# Patient Record
Sex: FEMALE | Race: BLACK OR AFRICAN AMERICAN | URBAN - METROPOLITAN AREA
[De-identification: names, ages, dates, MRNs, and addresses within clinical notes are randomized per-mention and may not be internally consistent; named-entity substitution may affect disease eponyms.]

---

## 2024-06-19 ENCOUNTER — HOSPITAL ENCOUNTER (INPATIENT)
Facility: HOSPITAL | Age: 49
LOS: 6 days | Discharge: HOME | DRG: 287 | End: 2024-06-25
Attending: EMERGENCY MEDICINE | Admitting: INTERNAL MEDICINE
Payer: COMMERCIAL

## 2024-06-19 ENCOUNTER — APPOINTMENT (OUTPATIENT)
Facility: HOSPITAL | Age: 49
DRG: 287 | End: 2024-06-19
Payer: COMMERCIAL

## 2024-06-19 ENCOUNTER — HOSPITAL ENCOUNTER (INPATIENT)
Facility: HOSPITAL | Age: 49
DRG: 287 | End: 2024-06-19
Attending: INTERNAL MEDICINE
Payer: COMMERCIAL

## 2024-06-19 DIAGNOSIS — R07.9 CHEST PAIN, UNSPECIFIED TYPE: ICD-10-CM

## 2024-06-19 DIAGNOSIS — R79.89 ELEVATED BRAIN NATRIURETIC PEPTIDE (BNP) LEVEL: ICD-10-CM

## 2024-06-19 DIAGNOSIS — I42.9 CARDIOMYOPATHY, UNSPECIFIED TYPE (HCC): ICD-10-CM

## 2024-06-19 DIAGNOSIS — J81.0 ACUTE PULMONARY EDEMA (HCC): ICD-10-CM

## 2024-06-19 DIAGNOSIS — I50.9 ACUTE CONGESTIVE HEART FAILURE, UNSPECIFIED HEART FAILURE TYPE (HCC): Primary | ICD-10-CM

## 2024-06-19 DIAGNOSIS — I50.42 HEART FAILURE, SYSTOLIC AND DIASTOLIC, CHRONIC (HCC): ICD-10-CM

## 2024-06-19 LAB
ALBUMIN SERPL-MCNC: 3.5 G/DL (ref 3.5–5)
ALBUMIN/GLOB SERPL: 0.9 (ref 1.1–2.2)
ALP SERPL-CCNC: 76 U/L (ref 45–117)
ALT SERPL-CCNC: 71 U/L (ref 12–78)
AMPHET UR QL SCN: NEGATIVE
ANION GAP SERPL CALC-SCNC: 5 MMOL/L (ref 5–15)
APPEARANCE UR: CLEAR
AST SERPL-CCNC: 56 U/L (ref 15–37)
BACTERIA URNS QL MICRO: NEGATIVE /HPF
BARBITURATES UR QL SCN: NEGATIVE
BASOPHILS # BLD: 0.1 K/UL (ref 0–0.1)
BASOPHILS NFR BLD: 1 % (ref 0–1)
BENZODIAZ UR QL: NEGATIVE
BILIRUB SERPL-MCNC: 1.4 MG/DL (ref 0.2–1)
BILIRUB UR QL: NEGATIVE
BUN SERPL-MCNC: 19 MG/DL (ref 6–20)
BUN/CREAT SERPL: 20 (ref 12–20)
CALCIUM SERPL-MCNC: 9.1 MG/DL (ref 8.5–10.1)
CANNABINOIDS UR QL SCN: NEGATIVE
CHLORIDE SERPL-SCNC: 109 MMOL/L (ref 97–108)
CO2 SERPL-SCNC: 24 MMOL/L (ref 21–32)
COCAINE UR QL SCN: NEGATIVE
COLOR UR: ABNORMAL
COMMENT:: NORMAL
CREAT SERPL-MCNC: 0.93 MG/DL (ref 0.55–1.02)
DIFFERENTIAL METHOD BLD: ABNORMAL
ECHO AO ASC DIAM: 2.4 CM
ECHO AO ASCENDING AORTA INDEX: 1.14 CM/M2
ECHO AO ROOT DIAM: 2.4 CM
ECHO AO ROOT INDEX: 1.14 CM/M2
ECHO AV AREA PEAK VELOCITY: 2.6 CM2
ECHO AV AREA VTI: 2.9 CM2
ECHO AV AREA/BSA PEAK VELOCITY: 1.2 CM2/M2
ECHO AV AREA/BSA VTI: 1.4 CM2/M2
ECHO AV MEAN GRADIENT: 3 MMHG
ECHO AV MEAN VELOCITY: 0.9 M/S
ECHO AV PEAK GRADIENT: 7 MMHG
ECHO AV PEAK VELOCITY: 1.4 M/S
ECHO AV VELOCITY RATIO: 0.79
ECHO AV VTI: 20.7 CM
ECHO BSA: 2.19 M2
ECHO EST RA PRESSURE: 5 MMHG
ECHO LA DIAMETER INDEX: 2.37 CM/M2
ECHO LA DIAMETER: 5 CM
ECHO LA TO AORTIC ROOT RATIO: 2.08
ECHO LA VOL A-L A2C: 121 ML (ref 22–52)
ECHO LA VOL A-L A4C: 136 ML (ref 22–52)
ECHO LA VOL MOD A2C: 116 ML (ref 22–52)
ECHO LA VOL MOD A4C: 124 ML (ref 22–52)
ECHO LA VOLUME AREA LENGTH: 135 ML
ECHO LA VOLUME INDEX A-L A2C: 57 ML/M2 (ref 16–34)
ECHO LA VOLUME INDEX A-L A4C: 64 ML/M2 (ref 16–34)
ECHO LA VOLUME INDEX AREA LENGTH: 64 ML/M2 (ref 16–34)
ECHO LA VOLUME INDEX MOD A2C: 55 ML/M2 (ref 16–34)
ECHO LA VOLUME INDEX MOD A4C: 59 ML/M2 (ref 16–34)
ECHO LV E' LATERAL VELOCITY: 6 CM/S
ECHO LV E' SEPTAL VELOCITY: 2 CM/S
ECHO LV FRACTIONAL SHORTENING: 6 % (ref 28–44)
ECHO LV INTERNAL DIMENSION DIASTOLE INDEX: 3.03 CM/M2
ECHO LV INTERNAL DIMENSION DIASTOLIC: 6.4 CM (ref 3.9–5.3)
ECHO LV INTERNAL DIMENSION SYSTOLIC INDEX: 2.84 CM/M2
ECHO LV INTERNAL DIMENSION SYSTOLIC: 6 CM
ECHO LV IVSD: 1 CM (ref 0.6–0.9)
ECHO LV MASS 2D: 330.4 G (ref 67–162)
ECHO LV MASS INDEX 2D: 156.6 G/M2 (ref 43–95)
ECHO LV POSTERIOR WALL DIASTOLIC: 1.3 CM (ref 0.6–0.9)
ECHO LV RELATIVE WALL THICKNESS RATIO: 0.41
ECHO LVOT AREA: 3.1 CM2
ECHO LVOT AV VTI INDEX: 0.94
ECHO LVOT DIAM: 2 CM
ECHO LVOT MEAN GRADIENT: 3 MMHG
ECHO LVOT PEAK GRADIENT: 5 MMHG
ECHO LVOT PEAK VELOCITY: 1.1 M/S
ECHO LVOT STROKE VOLUME INDEX: 29 ML/M2
ECHO LVOT SV: 61.2 ML
ECHO LVOT VTI: 19.5 CM
ECHO MV A VELOCITY: 0.45 M/S
ECHO MV AREA PHT: 3.3 CM2
ECHO MV AREA VTI: 2.2 CM2
ECHO MV E DECELERATION TIME (DT): 229.3 MS
ECHO MV E VELOCITY: 1.37 M/S
ECHO MV E/A RATIO: 3.04
ECHO MV E/E' LATERAL: 22.83
ECHO MV E/E' RATIO (AVERAGED): 45.67
ECHO MV E/E' SEPTAL: 68.5
ECHO MV LVOT VTI INDEX: 1.45
ECHO MV MAX VELOCITY: 1.6 M/S
ECHO MV MEAN GRADIENT: 3 MMHG
ECHO MV MEAN VELOCITY: 0.7 M/S
ECHO MV PEAK GRADIENT: 11 MMHG
ECHO MV PRESSURE HALF TIME (PHT): 66.5 MS
ECHO MV REGURGITANT PEAK GRADIENT: 81 MMHG
ECHO MV REGURGITANT PEAK VELOCITY: 4.5 M/S
ECHO MV VTI: 28.2 CM
ECHO PULMONARY ARTERY END DIASTOLIC PRESSURE: 24 MMHG
ECHO PV MAX VELOCITY: 0.8 M/S
ECHO PV PEAK GRADIENT: 3 MMHG
ECHO RIGHT VENTRICULAR SYSTOLIC PRESSURE (RVSP): 58 MMHG
ECHO RV FREE WALL PEAK S': 6 CM/S
ECHO RV TAPSE: 1.9 CM (ref 1.7–?)
ECHO TV REGURGITANT MAX VELOCITY: 3.65 M/S
ECHO TV REGURGITANT PEAK GRADIENT: 53 MMHG
EOSINOPHIL # BLD: 0.1 K/UL (ref 0–0.4)
EOSINOPHIL NFR BLD: 1 % (ref 0–7)
EPITH CASTS URNS QL MICRO: ABNORMAL /LPF
ERYTHROCYTE [DISTWIDTH] IN BLOOD BY AUTOMATED COUNT: 14.3 % (ref 11.5–14.5)
GLOBULIN SER CALC-MCNC: 3.8 G/DL (ref 2–4)
GLUCOSE BLD STRIP.AUTO-MCNC: 123 MG/DL (ref 65–117)
GLUCOSE BLD STRIP.AUTO-MCNC: 123 MG/DL (ref 65–117)
GLUCOSE SERPL-MCNC: 128 MG/DL (ref 65–100)
GLUCOSE UR STRIP.AUTO-MCNC: NEGATIVE MG/DL
HCT VFR BLD AUTO: 43.3 % (ref 35–47)
HGB BLD-MCNC: 14.1 G/DL (ref 11.5–16)
HGB UR QL STRIP: ABNORMAL
HYALINE CASTS URNS QL MICRO: ABNORMAL /LPF (ref 0–5)
IMM GRANULOCYTES # BLD AUTO: 0 K/UL (ref 0–0.04)
IMM GRANULOCYTES NFR BLD AUTO: 0 % (ref 0–0.5)
KETONES UR QL STRIP.AUTO: NEGATIVE MG/DL
LEUKOCYTE ESTERASE UR QL STRIP.AUTO: NEGATIVE
LIPASE SERPL-CCNC: 34 U/L (ref 13–75)
LYMPHOCYTES # BLD: 2.5 K/UL (ref 0.8–3.5)
LYMPHOCYTES NFR BLD: 28 % (ref 12–49)
Lab: NORMAL
MCH RBC QN AUTO: 33.1 PG (ref 26–34)
MCHC RBC AUTO-ENTMCNC: 32.6 G/DL (ref 30–36.5)
MCV RBC AUTO: 101.6 FL (ref 80–99)
METHADONE UR QL: NEGATIVE
MONOCYTES # BLD: 0.6 K/UL (ref 0–1)
MONOCYTES NFR BLD: 7 % (ref 5–13)
NEUTS SEG # BLD: 5.8 K/UL (ref 1.8–8)
NEUTS SEG NFR BLD: 63 % (ref 32–75)
NITRITE UR QL STRIP.AUTO: NEGATIVE
NRBC # BLD: 0 K/UL (ref 0–0.01)
NRBC BLD-RTO: 0 PER 100 WBC
NT PRO BNP: 1433 PG/ML
OPIATES UR QL: NEGATIVE
PCP UR QL: NEGATIVE
PH UR STRIP: 6.5 (ref 5–8)
PLATELET # BLD AUTO: 161 K/UL (ref 150–400)
PMV BLD AUTO: 11.6 FL (ref 8.9–12.9)
POTASSIUM SERPL-SCNC: 3.9 MMOL/L (ref 3.5–5.1)
PROT SERPL-MCNC: 7.3 G/DL (ref 6.4–8.2)
PROT UR STRIP-MCNC: NEGATIVE MG/DL
RBC # BLD AUTO: 4.26 M/UL (ref 3.8–5.2)
RBC #/AREA URNS HPF: ABNORMAL /HPF (ref 0–5)
SERVICE CMNT-IMP: ABNORMAL
SERVICE CMNT-IMP: ABNORMAL
SODIUM SERPL-SCNC: 138 MMOL/L (ref 136–145)
SP GR UR REFRACTOMETRY: 1.02 (ref 1–1.03)
SPECIMEN HOLD: NORMAL
TROPONIN I SERPL HS-MCNC: 33 NG/L (ref 0–51)
TROPONIN I SERPL HS-MCNC: 35 NG/L (ref 0–51)
TROPONIN I SERPL HS-MCNC: 38 NG/L (ref 0–51)
URINE CULTURE IF INDICATED: ABNORMAL
UROBILINOGEN UR QL STRIP.AUTO: 0.2 EU/DL (ref 0.2–1)
WBC # BLD AUTO: 9.1 K/UL (ref 3.6–11)
WBC URNS QL MICRO: ABNORMAL /HPF (ref 0–4)

## 2024-06-19 PROCEDURE — 2060000000 HC ICU INTERMEDIATE R&B

## 2024-06-19 PROCEDURE — 6370000000 HC RX 637 (ALT 250 FOR IP): Performed by: INTERNAL MEDICINE

## 2024-06-19 PROCEDURE — 83690 ASSAY OF LIPASE: CPT

## 2024-06-19 PROCEDURE — 76705 ECHO EXAM OF ABDOMEN: CPT

## 2024-06-19 PROCEDURE — 99285 EMERGENCY DEPT VISIT HI MDM: CPT

## 2024-06-19 PROCEDURE — 80307 DRUG TEST PRSMV CHEM ANLYZR: CPT

## 2024-06-19 PROCEDURE — 6360000002 HC RX W HCPCS: Performed by: INTERNAL MEDICINE

## 2024-06-19 PROCEDURE — 78227 HEPATOBIL SYST IMAGE W/DRUG: CPT

## 2024-06-19 PROCEDURE — 71046 X-RAY EXAM CHEST 2 VIEWS: CPT

## 2024-06-19 PROCEDURE — 83880 ASSAY OF NATRIURETIC PEPTIDE: CPT

## 2024-06-19 PROCEDURE — 6360000002 HC RX W HCPCS: Performed by: EMERGENCY MEDICINE

## 2024-06-19 PROCEDURE — 36415 COLL VENOUS BLD VENIPUNCTURE: CPT

## 2024-06-19 PROCEDURE — 6370000000 HC RX 637 (ALT 250 FOR IP): Performed by: EMERGENCY MEDICINE

## 2024-06-19 PROCEDURE — 96374 THER/PROPH/DIAG INJ IV PUSH: CPT

## 2024-06-19 PROCEDURE — 93306 TTE W/DOPPLER COMPLETE: CPT

## 2024-06-19 PROCEDURE — 96375 TX/PRO/DX INJ NEW DRUG ADDON: CPT

## 2024-06-19 PROCEDURE — 81001 URINALYSIS AUTO W/SCOPE: CPT

## 2024-06-19 PROCEDURE — 85025 COMPLETE CBC W/AUTO DIFF WBC: CPT

## 2024-06-19 PROCEDURE — 6360000004 HC RX CONTRAST MEDICATION: Performed by: RADIOLOGY

## 2024-06-19 PROCEDURE — 2580000003 HC RX 258: Performed by: INTERNAL MEDICINE

## 2024-06-19 PROCEDURE — 80053 COMPREHEN METABOLIC PANEL: CPT

## 2024-06-19 PROCEDURE — 84484 ASSAY OF TROPONIN QUANT: CPT

## 2024-06-19 PROCEDURE — 82962 GLUCOSE BLOOD TEST: CPT

## 2024-06-19 PROCEDURE — 3430000000 HC RX DIAGNOSTIC RADIOPHARMACEUTICAL: Performed by: INTERNAL MEDICINE

## 2024-06-19 PROCEDURE — 6360000004 HC RX CONTRAST MEDICATION: Performed by: INTERNAL MEDICINE

## 2024-06-19 PROCEDURE — A9537 TC99M MEBROFENIN: HCPCS | Performed by: INTERNAL MEDICINE

## 2024-06-19 PROCEDURE — 71275 CT ANGIOGRAPHY CHEST: CPT

## 2024-06-19 PROCEDURE — 2580000003 HC RX 258: Performed by: EMERGENCY MEDICINE

## 2024-06-19 RX ORDER — ENOXAPARIN SODIUM 100 MG/ML
30 INJECTION SUBCUTANEOUS 2 TIMES DAILY
Status: DISCONTINUED | OUTPATIENT
Start: 2024-06-19 | End: 2024-06-21

## 2024-06-19 RX ORDER — 0.9 % SODIUM CHLORIDE 0.9 %
1000 INTRAVENOUS SOLUTION INTRAVENOUS ONCE
Status: COMPLETED | OUTPATIENT
Start: 2024-06-19 | End: 2024-06-19

## 2024-06-19 RX ORDER — NITROGLYCERIN 0.4 MG/1
0.4 TABLET SUBLINGUAL EVERY 5 MIN PRN
Status: DISCONTINUED | OUTPATIENT
Start: 2024-06-19 | End: 2024-06-25 | Stop reason: HOSPADM

## 2024-06-19 RX ORDER — ONDANSETRON 2 MG/ML
4 INJECTION INTRAMUSCULAR; INTRAVENOUS EVERY 6 HOURS PRN
Status: DISCONTINUED | OUTPATIENT
Start: 2024-06-19 | End: 2024-06-25 | Stop reason: HOSPADM

## 2024-06-19 RX ORDER — HYDRALAZINE HYDROCHLORIDE 25 MG/1
25 TABLET, FILM COATED ORAL 2 TIMES DAILY
Status: COMPLETED | OUTPATIENT
Start: 2024-06-19 | End: 2024-06-20

## 2024-06-19 RX ORDER — ISOSORBIDE DINITRATE 10 MG/1
20 TABLET ORAL 2 TIMES DAILY
Status: DISCONTINUED | OUTPATIENT
Start: 2024-06-19 | End: 2024-06-20

## 2024-06-19 RX ORDER — NICOTINE 21 MG/24HR
1 PATCH, TRANSDERMAL 24 HOURS TRANSDERMAL DAILY
Status: DISCONTINUED | OUTPATIENT
Start: 2024-06-19 | End: 2024-06-19

## 2024-06-19 RX ORDER — ROSUVASTATIN CALCIUM 10 MG/1
10 TABLET, COATED ORAL NIGHTLY
Status: DISCONTINUED | OUTPATIENT
Start: 2024-06-19 | End: 2024-06-25 | Stop reason: HOSPADM

## 2024-06-19 RX ORDER — LOSARTAN POTASSIUM 25 MG/1
25 TABLET ORAL DAILY
Status: DISCONTINUED | OUTPATIENT
Start: 2024-06-19 | End: 2024-06-20

## 2024-06-19 RX ORDER — HYDROCHLOROTHIAZIDE 25 MG/1
TABLET ORAL
Status: ON HOLD | COMMUNITY
End: 2024-06-25 | Stop reason: HOSPADM

## 2024-06-19 RX ORDER — SODIUM CHLORIDE 0.9 % (FLUSH) 0.9 %
5-40 SYRINGE (ML) INJECTION PRN
Status: DISCONTINUED | OUTPATIENT
Start: 2024-06-19 | End: 2024-06-25 | Stop reason: HOSPADM

## 2024-06-19 RX ORDER — MAGNESIUM SULFATE IN WATER 40 MG/ML
2000 INJECTION, SOLUTION INTRAVENOUS PRN
Status: DISCONTINUED | OUTPATIENT
Start: 2024-06-19 | End: 2024-06-25 | Stop reason: HOSPADM

## 2024-06-19 RX ORDER — MILRINONE LACTATE 0.2 MG/ML
.0625-.75 INJECTION, SOLUTION INTRAVENOUS CONTINUOUS
Status: DISCONTINUED | OUTPATIENT
Start: 2024-06-19 | End: 2024-06-23

## 2024-06-19 RX ORDER — FUROSEMIDE 10 MG/ML
20 INJECTION INTRAMUSCULAR; INTRAVENOUS
Status: COMPLETED | OUTPATIENT
Start: 2024-06-19 | End: 2024-06-19

## 2024-06-19 RX ORDER — PANTOPRAZOLE SODIUM 40 MG/1
40 TABLET, DELAYED RELEASE ORAL
Status: DISCONTINUED | OUTPATIENT
Start: 2024-06-20 | End: 2024-06-25 | Stop reason: HOSPADM

## 2024-06-19 RX ORDER — ONDANSETRON 4 MG/1
4 TABLET, ORALLY DISINTEGRATING ORAL EVERY 8 HOURS PRN
Status: DISCONTINUED | OUTPATIENT
Start: 2024-06-19 | End: 2024-06-25 | Stop reason: HOSPADM

## 2024-06-19 RX ORDER — NICOTINE 21 MG/24HR
1 PATCH, TRANSDERMAL 24 HOURS TRANSDERMAL DAILY
Status: DISCONTINUED | OUTPATIENT
Start: 2024-06-19 | End: 2024-06-25 | Stop reason: HOSPADM

## 2024-06-19 RX ORDER — KETOROLAC TROMETHAMINE 30 MG/ML
15 INJECTION, SOLUTION INTRAMUSCULAR; INTRAVENOUS
Status: COMPLETED | OUTPATIENT
Start: 2024-06-19 | End: 2024-06-19

## 2024-06-19 RX ORDER — POLYETHYLENE GLYCOL 3350 17 G/17G
17 POWDER, FOR SOLUTION ORAL DAILY PRN
Status: DISCONTINUED | OUTPATIENT
Start: 2024-06-19 | End: 2024-06-25 | Stop reason: HOSPADM

## 2024-06-19 RX ORDER — ACETAMINOPHEN 650 MG/1
650 SUPPOSITORY RECTAL EVERY 6 HOURS PRN
Status: DISCONTINUED | OUTPATIENT
Start: 2024-06-19 | End: 2024-06-25 | Stop reason: HOSPADM

## 2024-06-19 RX ORDER — FAMOTIDINE 20 MG/1
20 TABLET, FILM COATED ORAL
Status: COMPLETED | OUTPATIENT
Start: 2024-06-19 | End: 2024-06-19

## 2024-06-19 RX ORDER — SODIUM CHLORIDE 9 MG/ML
INJECTION, SOLUTION INTRAVENOUS PRN
Status: DISCONTINUED | OUTPATIENT
Start: 2024-06-19 | End: 2024-06-25 | Stop reason: HOSPADM

## 2024-06-19 RX ORDER — POTASSIUM CHLORIDE 750 MG/1
40 TABLET, FILM COATED, EXTENDED RELEASE ORAL PRN
Status: DISCONTINUED | OUTPATIENT
Start: 2024-06-19 | End: 2024-06-25 | Stop reason: HOSPADM

## 2024-06-19 RX ORDER — ACETAMINOPHEN 325 MG/1
650 TABLET ORAL EVERY 6 HOURS PRN
Status: DISCONTINUED | OUTPATIENT
Start: 2024-06-19 | End: 2024-06-21 | Stop reason: SDUPTHER

## 2024-06-19 RX ORDER — OMEPRAZOLE 40 MG/1
40 CAPSULE, DELAYED RELEASE ORAL 2 TIMES DAILY
COMMUNITY
Start: 2024-06-14 | End: 2024-06-28

## 2024-06-19 RX ORDER — MORPHINE SULFATE 2 MG/ML
2 INJECTION, SOLUTION INTRAMUSCULAR; INTRAVENOUS EVERY 4 HOURS PRN
Status: DISCONTINUED | OUTPATIENT
Start: 2024-06-19 | End: 2024-06-25 | Stop reason: HOSPADM

## 2024-06-19 RX ORDER — FUROSEMIDE 10 MG/ML
40 INJECTION INTRAMUSCULAR; INTRAVENOUS
Status: DISCONTINUED | OUTPATIENT
Start: 2024-06-19 | End: 2024-06-19

## 2024-06-19 RX ORDER — INSULIN LISPRO 100 [IU]/ML
0-4 INJECTION, SOLUTION INTRAVENOUS; SUBCUTANEOUS NIGHTLY
Status: DISCONTINUED | OUTPATIENT
Start: 2024-06-19 | End: 2024-06-20

## 2024-06-19 RX ORDER — ONDANSETRON 2 MG/ML
4 INJECTION INTRAMUSCULAR; INTRAVENOUS
Status: COMPLETED | OUTPATIENT
Start: 2024-06-19 | End: 2024-06-19

## 2024-06-19 RX ORDER — INSULIN LISPRO 100 [IU]/ML
0-8 INJECTION, SOLUTION INTRAVENOUS; SUBCUTANEOUS
Status: DISCONTINUED | OUTPATIENT
Start: 2024-06-19 | End: 2024-06-20

## 2024-06-19 RX ORDER — POTASSIUM CHLORIDE 7.45 MG/ML
10 INJECTION INTRAVENOUS PRN
Status: DISCONTINUED | OUTPATIENT
Start: 2024-06-19 | End: 2024-06-25 | Stop reason: HOSPADM

## 2024-06-19 RX ORDER — SINCALIDE 5 UG/5ML
0.02 INJECTION, POWDER, LYOPHILIZED, FOR SOLUTION INTRAVENOUS ONCE
Status: COMPLETED | OUTPATIENT
Start: 2024-06-19 | End: 2024-06-19

## 2024-06-19 RX ORDER — CARVEDILOL 6.25 MG/1
3.12 TABLET ORAL 2 TIMES DAILY WITH MEALS
Status: DISCONTINUED | OUTPATIENT
Start: 2024-06-20 | End: 2024-06-19

## 2024-06-19 RX ORDER — IPRATROPIUM BROMIDE AND ALBUTEROL SULFATE 2.5; .5 MG/3ML; MG/3ML
1 SOLUTION RESPIRATORY (INHALATION) EVERY 4 HOURS PRN
Status: DISCONTINUED | OUTPATIENT
Start: 2024-06-19 | End: 2024-06-25 | Stop reason: HOSPADM

## 2024-06-19 RX ORDER — SODIUM CHLORIDE 9 MG/ML
INJECTION, SOLUTION INTRAVENOUS ONCE
Status: COMPLETED | OUTPATIENT
Start: 2024-06-19 | End: 2024-06-19

## 2024-06-19 RX ORDER — SODIUM CHLORIDE 0.9 % (FLUSH) 0.9 %
5-40 SYRINGE (ML) INJECTION EVERY 12 HOURS SCHEDULED
Status: DISCONTINUED | OUTPATIENT
Start: 2024-06-19 | End: 2024-06-25 | Stop reason: HOSPADM

## 2024-06-19 RX ORDER — OXYCODONE HYDROCHLORIDE 5 MG/1
5 TABLET ORAL EVERY 4 HOURS PRN
Status: DISCONTINUED | OUTPATIENT
Start: 2024-06-19 | End: 2024-06-25 | Stop reason: HOSPADM

## 2024-06-19 RX ORDER — KIT FOR THE PREPARATION OF TECHNETIUM TC 99M MEBROFENIN 45 MG/10ML
5 INJECTION, POWDER, LYOPHILIZED, FOR SOLUTION INTRAVENOUS
Status: COMPLETED | OUTPATIENT
Start: 2024-06-19 | End: 2024-06-19

## 2024-06-19 RX ADMIN — ENOXAPARIN SODIUM 30 MG: 100 INJECTION SUBCUTANEOUS at 20:45

## 2024-06-19 RX ADMIN — MEBROFENIN 5 MILLICURIE: 45 INJECTION, POWDER, LYOPHILIZED, FOR SOLUTION INTRAVENOUS at 13:35

## 2024-06-19 RX ADMIN — HYDRALAZINE HYDROCHLORIDE 25 MG: 25 TABLET ORAL at 23:45

## 2024-06-19 RX ADMIN — SODIUM CHLORIDE, PRESERVATIVE FREE 10 ML: 5 INJECTION INTRAVENOUS at 21:35

## 2024-06-19 RX ADMIN — SODIUM CHLORIDE 1000 ML: 9 INJECTION, SOLUTION INTRAVENOUS at 08:20

## 2024-06-19 RX ADMIN — FAMOTIDINE 20 MG: 20 TABLET, FILM COATED ORAL at 08:49

## 2024-06-19 RX ADMIN — LOSARTAN POTASSIUM 25 MG: 25 TABLET, FILM COATED ORAL at 12:40

## 2024-06-19 RX ADMIN — FUROSEMIDE 20 MG: 10 INJECTION, SOLUTION INTRAMUSCULAR; INTRAVENOUS at 11:07

## 2024-06-19 RX ADMIN — ISOSORBIDE DINITRATE 20 MG: 10 TABLET ORAL at 23:45

## 2024-06-19 RX ADMIN — MILRINONE LACTATE 0.12 MCG/KG/MIN: 0.2 INJECTION, SOLUTION INTRAVENOUS at 23:49

## 2024-06-19 RX ADMIN — FUROSEMIDE 40 MG: 10 INJECTION, SOLUTION INTRAMUSCULAR; INTRAVENOUS at 15:30

## 2024-06-19 RX ADMIN — ONDANSETRON 4 MG: 2 INJECTION INTRAMUSCULAR; INTRAVENOUS at 08:27

## 2024-06-19 RX ADMIN — SODIUM CHLORIDE 25 ML: 9 INJECTION, SOLUTION INTRAVENOUS at 14:44

## 2024-06-19 RX ADMIN — IOPAMIDOL 80 ML: 755 INJECTION, SOLUTION INTRAVENOUS at 09:38

## 2024-06-19 RX ADMIN — ALUMINUM HYDROXIDE, MAGNESIUM HYDROXIDE, AND SIMETHICONE 40 ML: 1200; 120; 1200 SUSPENSION ORAL at 08:48

## 2024-06-19 RX ADMIN — SINCALIDE 2.06 MCG: 5 INJECTION, POWDER, LYOPHILIZED, FOR SOLUTION INTRAVENOUS at 14:44

## 2024-06-19 RX ADMIN — ROSUVASTATIN 10 MG: 10 TABLET, FILM COATED ORAL at 20:45

## 2024-06-19 RX ADMIN — KETOROLAC TROMETHAMINE 15 MG: 30 INJECTION, SOLUTION INTRAMUSCULAR at 08:26

## 2024-06-19 ASSESSMENT — PAIN DESCRIPTION - LOCATION
LOCATION: CHEST
LOCATION: ABDOMEN

## 2024-06-19 ASSESSMENT — PAIN - FUNCTIONAL ASSESSMENT
PAIN_FUNCTIONAL_ASSESSMENT: PREVENTS OR INTERFERES SOME ACTIVE ACTIVITIES AND ADLS
PAIN_FUNCTIONAL_ASSESSMENT: 0-10
PAIN_FUNCTIONAL_ASSESSMENT: 0-10

## 2024-06-19 ASSESSMENT — PAIN DESCRIPTION - ONSET: ONSET: SUDDEN

## 2024-06-19 ASSESSMENT — PAIN DESCRIPTION - FREQUENCY: FREQUENCY: CONTINUOUS

## 2024-06-19 ASSESSMENT — PAIN DESCRIPTION - ORIENTATION: ORIENTATION: MID

## 2024-06-19 ASSESSMENT — PAIN DESCRIPTION - PAIN TYPE: TYPE: ACUTE PAIN

## 2024-06-19 ASSESSMENT — PAIN SCALES - GENERAL
PAINLEVEL_OUTOF10: 8
PAINLEVEL_OUTOF10: 0
PAINLEVEL_OUTOF10: 10
PAINLEVEL_OUTOF10: 0

## 2024-06-19 ASSESSMENT — PAIN DESCRIPTION - DESCRIPTORS: DESCRIPTORS: BURNING

## 2024-06-19 NOTE — CONSULTS
Granulocytes Absolute 0.0 0.00 - 0.04 K/UL    Differential Type AUTOMATED     Comprehensive Metabolic Panel    Collection Time: 06/19/24  7:47 AM   Result Value Ref Range    Sodium 138 136 - 145 mmol/L    Potassium 3.9 3.5 - 5.1 mmol/L    Chloride 109 (H) 97 - 108 mmol/L    CO2 24 21 - 32 mmol/L    Anion Gap 5 5 - 15 mmol/L    Glucose 128 (H) 65 - 100 mg/dL    BUN 19 6 - 20 MG/DL    Creatinine 0.93 0.55 - 1.02 MG/DL    BUN/Creatinine Ratio 20 12 - 20      Est, Glom Filt Rate 77 >60 ml/min/1.73m2    Calcium 9.1 8.5 - 10.1 MG/DL    Total Bilirubin 1.4 (H) 0.2 - 1.0 MG/DL    ALT 71 12 - 78 U/L    AST 56 (H) 15 - 37 U/L    Alk Phosphatase 76 45 - 117 U/L    Total Protein 7.3 6.4 - 8.2 g/dL    Albumin 3.5 3.5 - 5.0 g/dL    Globulin 3.8 2.0 - 4.0 g/dL    Albumin/Globulin Ratio 0.9 (L) 1.1 - 2.2     Troponin    Collection Time: 06/19/24  7:47 AM   Result Value Ref Range    Troponin, High Sensitivity 33 0 - 51 ng/L   Extra Tubes Hold    Collection Time: 06/19/24  7:47 AM   Result Value Ref Range    Specimen HOld 1RED,1BLU     Comment:        Add-on orders for these samples will be processed based on acceptable specimen integrity and analyte stability, which may vary by analyte.   Lipase    Collection Time: 06/19/24  7:47 AM   Result Value Ref Range    Lipase 34 13 - 75 U/L   Brain Natriuretic Peptide    Collection Time: 06/19/24  7:47 AM   Result Value Ref Range    NT Pro-BNP 1,433 (H) <125 PG/ML       Data Review:  ECG tracing personally reviewed:   As above    Echocardiogram:  Pending    Other cardiac testing:   As above    Other imaging:   CTA chest:  Moderate cardiomegaly with right atrial enlargement. Reflux of contrast into the  hepatic veins.     IMPRESSION:     1. No evidence for acute pulmonary embolism.  2. Edema with small right pleural effusion.  3. 5 mm right lung nodule.  4. Mildly enlarged paratracheal mediastinal lymph nodes.  5. Right heart enlargement and dysfunction.    Niko Hernandez MD  Structural

## 2024-06-19 NOTE — CARE COORDINATION
Transition of Care Plan:    RUR: 7% Low   Prior Level of Functioning: Independent   Disposition: Home   Follow up appointments: On AVS   DME needed: None expected   Transportation at discharge: Family  Caregiver Contact: Lakisha junior [phone]  Discharge Caregiver contacted prior to discharge? No   Care Conference needed? 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 Pt drove to [place] for hospital/ medical care due to OON status in [place]. Pharmacy is [facility] in [place], [place].     Only CM need identified is need for a new PCP. [place]. This CM sent email request to CM Specialist PCP request.     SUSAN Scott    [facility]    or by Perfect Vicki

## 2024-06-19 NOTE — H&P
History and Physical    Date of Service:  6/19/2024  Primary Care Provider: No primary care provider on file.  Source of information: The patient and review of EMR    Chief Complaint: Chest Pain and Abdominal Pain (RUQ)      History of Presenting Illness:   Yudith Park is a 48 y.o. female with past medical history significant for hypertension, dyslipidemia, type 2 diabetes presented at the emergency room with chest pain and shortness of breath.  Patient symptoms have been going on for about a year but her symptoms got worse a month ago.  Chest pain is located at the middle of the chest with radiation to the right upper quadrant, constant, dull ache, no known aggravating or relieving factors, 6/10 severity.  Patient has no history of coronary artery disease.  Patient also denies history of asthma/COPD.  She also reported nausea vomiting and diarrhea.  No sick contact.  No fever, rigors or chills reported. [facility].  She came to the emergency room because she was not getting better.  CTA of the chest done on arrival at the emergency room was negative for PE but this showed vascular congestion and patient BNP level is elevated.  Patient received Lasix for suspected congestive heart failure.  Cardiology service on-call consulted by ED physician.  She was subsequently referred to the hospitalist service for admission       REVIEW OF SYSTEMS:  REVIEW OF SYSTEMS:  HEAD, EYES, EARS, NOSE AND THROAT:  No headache.  No dizziness.  No blurring of vision.  No photophobia.  RESPIRATORY SYSTEM: This is positive for shortness of breath .  No cough.  No hemoptysis.  CARDIOVASCULAR SYSTEM: This is positive for chest pain .  No orthopnea.  No palpitations.  GASTROINTESTINAL SYSTEM: This is positive for abdominal pain, nausea and vomiting and diarrhea  No constipation.  GENITOURINARY SYSTEM:  No dysuria, no urgency, and no frequency.     All other systems are reviewed and  with low risk for lung cancer and solid nodule(s) less than or   equal to 6 mm in diameter require no follow-up.  In patients with a higher risk,   such as smokers, follow-up noncontrast chest CT should be considered at 12   months.  Patients with a known malignancy are at increased risk for metastasis   and should receive a three month follow-up.                  Electronically signed by Adam Ferreira      US ABDOMEN LIMITED Specify organ? GALLBLADDER, LIVER, PANCREAS   Final Result   No cholelithiasis. Nonspecific gallbladder wall thickening, and reported   sonographic South sign. No biliary dilatation. Trace right pleural effusion.         Electronically signed by Phil Connolly      XR CHEST (2 VW)   Final Result   Cardiomegaly. Mild pulmonary vascular congestion.          Electronically signed by Phil Connolly           ECG/ECHO:  [unfilled]       Notes reviewed from all clinical/nonclinical/nursing services involved in patient's clinical care. Care coordination discussions were held with appropriate clinical/nonclinical/ nursing providers based on care coordination needs.     Assessment:   Given the patient's current clinical presentation, there is a high level of concern for decompensation if discharged from the emergency department. Complex decision making was performed, which includes reviewing the patient's available past medical records, laboratory results, and imaging studies.    Principal Problem:    New onset of congestive heart failure (HCC)  Resolved Problems:    * No resolved hospital problems. *      A/P   1.  New onset congestive heart failure POA: Will admit the patient for further evaluation and treatment.  Will continue with Lasix started in the emergency room.  Patient already seen by the cardiologist at the request of ED physician.  Will await results of echocardiogram to determine ejection fraction and type of congestive heart failure.  This is the most likely cause of the patient's

## 2024-06-19 NOTE — ED PROVIDER NOTES
[facility] EMERGENCY DEP  EMERGENCY DEPARTMENT ENCOUNTER      Pt Name: Yudith Park  MRN: [id number]  Birthdate 1975  Date of evaluation: 6/19/2024  Provider: Waylon Alves DO      HISTORY OF PRESENT ILLNESS      HPI    48-year-old female presents to the emergency department complaining of midsternal chest pain and right upper quadrant abdominal pain that has been going on for a year but worsening over the last month.  She states [facility]  She complains of nausea, vomiting, diarrhea, shortness of breath.  She describes the pain as sometimes burning in nature.  She states that it is especially bad at night and keeps her from sleeping.  She denies any known history of reflux.  She states that she has seen other doctors in the past and was told that it might be coming from her gallbladder but she denies any history of prior imaging.    Nursing Notes were reviewed.    REVIEW OF SYSTEMS         Review of Systems        PAST MEDICAL HISTORY   No past medical history on file.      SURGICAL HISTORY     No past surgical history on file.      CURRENT MEDICATIONS       Previous Medications    No medications on file       ALLERGIES     Patient has no known allergies.    FAMILY HISTORY     No family history on file.       SOCIAL HISTORY       Social History     Socioeconomic History    Marital status: Single         PHYSICAL EXAM       ED Triage Vitals [06/19/24 0738]   BP Temp Temp Source Pulse Respirations SpO2 Height Weight   (!) 145/107 97.7 °F (36.5 °C) Oral 85 18 95 % -- --       Body mass index is 36.58 kg/m².    Physical Exam  Vitals and nursing note reviewed.   Constitutional:       General: She is not in acute distress.     Appearance: Normal appearance. She is not ill-appearing.      Comments: Very pleasant, no acute distress, speaking in full sentences.   HENT:      Head: Normocephalic and atraumatic.      Nose: Nose normal.      Mouth/Throat:      Mouth: Mucous membranes are  moist.   Eyes:      Extraocular Movements: Extraocular movements intact.      Conjunctiva/sclera: Conjunctivae normal.      Pupils: Pupils are equal, round, and reactive to light.   Cardiovascular:      Rate and Rhythm: Normal rate and regular rhythm.      Heart sounds: Normal heart sounds.   Pulmonary:      Effort: Pulmonary effort is normal.      Breath sounds: Normal breath sounds.   Abdominal:      General: There is no distension.      Palpations: Abdomen is soft.      Tenderness: There is abdominal tenderness in the right upper quadrant and epigastric area. There is no right CVA tenderness, left CVA tenderness, guarding or rebound.   Musculoskeletal:         General: No tenderness.      Cervical back: Neck supple.   Skin:     General: Skin is warm and dry.   Neurological:      General: No focal deficit present.      Mental Status: She is alert and oriented to person, place, and time.             EMERGENCY DEPARTMENT COURSE and DIFFERENTIAL DIAGNOSIS/MDM:   Vitals:    Vitals:    06/19/24 0738 06/19/24 1108 06/19/24 1114   BP: (!) 145/107  (!) 136/104   Pulse: 85  74   Resp: 18  23   Temp: 97.7 °F (36.5 °C)  98.1 °F (36.7 °C)   TempSrc: Oral  Oral   SpO2: 95%  100%   Weight:  102.8 kg (226 lb 10.1 oz)    Height:  1.676 m (5' 6\")          Medical Decision Making  Amount and/or Complexity of Data Reviewed  Labs: ordered.  Radiology: ordered.  ECG/medicine tests: ordered.    Risk  Prescription drug management.  Decision regarding hospitalization.      48-year-old female presents with epigastric/right upper quadrant abdominal pain radiating up into her chest for the last year but reportedly worse over the last month.  She is afebrile with vital signs stable in no acute distress.    Labs returned showing elevated BNP at 1433 but otherwise reassuring, negative troponin.    Chest x-ray was viewed by myself and read by radiology showing cardiomegaly and pulmonary vascular congestion but no pleural effusion.    Right

## 2024-06-19 NOTE — ED NOTES
Verbal shift change report given to SHARON Sheridan (oncoming nurse) by SHARON Durand (offgoing nurse). Report included the following information Nurse Handoff Report, Index, ED Encounter Summary, ED SBAR, Adult Overview, MAR, and Recent Results.

## 2024-06-20 PROBLEM — K82.8 DYSKINESIA OF GALLBLADDER: Status: ACTIVE | Noted: 2024-06-20

## 2024-06-20 LAB
ALBUMIN SERPL-MCNC: 2.8 G/DL (ref 3.5–5)
ALBUMIN/GLOB SERPL: 0.9 (ref 1.1–2.2)
ALP SERPL-CCNC: 56 U/L (ref 45–117)
ALT SERPL-CCNC: 66 U/L (ref 12–78)
ANION GAP SERPL CALC-SCNC: 5 MMOL/L (ref 5–15)
AST SERPL-CCNC: 43 U/L (ref 15–37)
BASOPHILS # BLD: 0.1 K/UL (ref 0–0.1)
BASOPHILS NFR BLD: 1 % (ref 0–1)
BILIRUB SERPL-MCNC: 0.8 MG/DL (ref 0.2–1)
BUN SERPL-MCNC: 23 MG/DL (ref 6–20)
BUN/CREAT SERPL: 23 (ref 12–20)
CALCIUM SERPL-MCNC: 8.2 MG/DL (ref 8.5–10.1)
CHLORIDE SERPL-SCNC: 112 MMOL/L (ref 97–108)
CHOLEST SERPL-MCNC: 82 MG/DL
CO2 SERPL-SCNC: 22 MMOL/L (ref 21–32)
CREAT SERPL-MCNC: 1.02 MG/DL (ref 0.55–1.02)
DIFFERENTIAL METHOD BLD: ABNORMAL
EOSINOPHIL # BLD: 0.1 K/UL (ref 0–0.4)
EOSINOPHIL NFR BLD: 1 % (ref 0–7)
ERYTHROCYTE [DISTWIDTH] IN BLOOD BY AUTOMATED COUNT: 14.3 % (ref 11.5–14.5)
EST. AVERAGE GLUCOSE BLD GHB EST-MCNC: 97 MG/DL
GLOBULIN SER CALC-MCNC: 3.1 G/DL (ref 2–4)
GLUCOSE BLD STRIP.AUTO-MCNC: 133 MG/DL (ref 65–117)
GLUCOSE BLD STRIP.AUTO-MCNC: 139 MG/DL (ref 65–117)
GLUCOSE SERPL-MCNC: 108 MG/DL (ref 65–100)
HBA1C MFR BLD: 5 % (ref 4–5.6)
HCT VFR BLD AUTO: 38.8 % (ref 35–47)
HDLC SERPL-MCNC: 19 MG/DL
HDLC SERPL: 4.3 (ref 0–5)
HGB BLD-MCNC: 12.3 G/DL (ref 11.5–16)
IMM GRANULOCYTES # BLD AUTO: 0 K/UL (ref 0–0.04)
IMM GRANULOCYTES NFR BLD AUTO: 0 % (ref 0–0.5)
LACTATE SERPL-SCNC: 1 MMOL/L (ref 0.4–2)
LDLC SERPL CALC-MCNC: 47 MG/DL (ref 0–100)
LYMPHOCYTES # BLD: 2.9 K/UL (ref 0.8–3.5)
LYMPHOCYTES NFR BLD: 35 % (ref 12–49)
MAGNESIUM SERPL-MCNC: 2.2 MG/DL (ref 1.6–2.4)
MCH RBC QN AUTO: 33.2 PG (ref 26–34)
MCHC RBC AUTO-ENTMCNC: 31.7 G/DL (ref 30–36.5)
MCV RBC AUTO: 104.9 FL (ref 80–99)
MONOCYTES # BLD: 0.6 K/UL (ref 0–1)
MONOCYTES NFR BLD: 7 % (ref 5–13)
NEUTS SEG # BLD: 4.7 K/UL (ref 1.8–8)
NEUTS SEG NFR BLD: 56 % (ref 32–75)
NRBC # BLD: 0 K/UL (ref 0–0.01)
NRBC BLD-RTO: 0 PER 100 WBC
PHOSPHATE SERPL-MCNC: 3.1 MG/DL (ref 2.6–4.7)
PLATELET # BLD AUTO: 148 K/UL (ref 150–400)
PMV BLD AUTO: 10.5 FL (ref 8.9–12.9)
POTASSIUM SERPL-SCNC: 3.5 MMOL/L (ref 3.5–5.1)
PROT SERPL-MCNC: 5.9 G/DL (ref 6.4–8.2)
RBC # BLD AUTO: 3.7 M/UL (ref 3.8–5.2)
SERVICE CMNT-IMP: ABNORMAL
SERVICE CMNT-IMP: ABNORMAL
SODIUM SERPL-SCNC: 139 MMOL/L (ref 136–145)
TRIGL SERPL-MCNC: 80 MG/DL
TROPONIN I SERPL HS-MCNC: 41 NG/L (ref 0–51)
TSH SERPL DL<=0.05 MIU/L-ACNC: 1.94 UIU/ML (ref 0.36–3.74)
VLDLC SERPL CALC-MCNC: 16 MG/DL
WBC # BLD AUTO: 8.3 K/UL (ref 3.6–11)

## 2024-06-20 PROCEDURE — 2580000003 HC RX 258: Performed by: INTERNAL MEDICINE

## 2024-06-20 PROCEDURE — 84484 ASSAY OF TROPONIN QUANT: CPT

## 2024-06-20 PROCEDURE — 84100 ASSAY OF PHOSPHORUS: CPT

## 2024-06-20 PROCEDURE — 80061 LIPID PANEL: CPT

## 2024-06-20 PROCEDURE — 6370000000 HC RX 637 (ALT 250 FOR IP): Performed by: INTERNAL MEDICINE

## 2024-06-20 PROCEDURE — 80053 COMPREHEN METABOLIC PANEL: CPT

## 2024-06-20 PROCEDURE — 6360000002 HC RX W HCPCS: Performed by: INTERNAL MEDICINE

## 2024-06-20 PROCEDURE — 83735 ASSAY OF MAGNESIUM: CPT

## 2024-06-20 PROCEDURE — 83605 ASSAY OF LACTIC ACID: CPT

## 2024-06-20 PROCEDURE — 85025 COMPLETE CBC W/AUTO DIFF WBC: CPT

## 2024-06-20 PROCEDURE — 84443 ASSAY THYROID STIM HORMONE: CPT

## 2024-06-20 PROCEDURE — 99255 IP/OBS CONSLTJ NEW/EST HI 80: CPT | Performed by: INTERNAL MEDICINE

## 2024-06-20 PROCEDURE — 36415 COLL VENOUS BLD VENIPUNCTURE: CPT

## 2024-06-20 PROCEDURE — 76937 US GUIDE VASCULAR ACCESS: CPT

## 2024-06-20 PROCEDURE — 82962 GLUCOSE BLOOD TEST: CPT

## 2024-06-20 PROCEDURE — 2060000000 HC ICU INTERMEDIATE R&B

## 2024-06-20 PROCEDURE — APPSS15 APP SPLIT SHARED TIME 0-15 MINUTES: Performed by: PHYSICIAN ASSISTANT

## 2024-06-20 PROCEDURE — 83036 HEMOGLOBIN GLYCOSYLATED A1C: CPT

## 2024-06-20 PROCEDURE — 2709999900 HC NON-CHARGEABLE SUPPLY

## 2024-06-20 RX ORDER — FUROSEMIDE 10 MG/ML
20 INJECTION INTRAMUSCULAR; INTRAVENOUS ONCE
Status: COMPLETED | OUTPATIENT
Start: 2024-06-20 | End: 2024-06-20

## 2024-06-20 RX ADMIN — SODIUM CHLORIDE, PRESERVATIVE FREE 10 ML: 5 INJECTION INTRAVENOUS at 20:36

## 2024-06-20 RX ADMIN — ISOSORBIDE DINITRATE 20 MG: 10 TABLET ORAL at 15:03

## 2024-06-20 RX ADMIN — MILRINONE LACTATE 0.12 MCG/KG/MIN: 0.2 INJECTION, SOLUTION INTRAVENOUS at 19:22

## 2024-06-20 RX ADMIN — HYDRALAZINE HYDROCHLORIDE 25 MG: 25 TABLET ORAL at 20:36

## 2024-06-20 RX ADMIN — HYDRALAZINE HYDROCHLORIDE 25 MG: 25 TABLET ORAL at 08:46

## 2024-06-20 RX ADMIN — FUROSEMIDE 20 MG: 10 INJECTION, SOLUTION INTRAMUSCULAR; INTRAVENOUS at 15:03

## 2024-06-20 RX ADMIN — ISOSORBIDE DINITRATE 20 MG: 10 TABLET ORAL at 08:45

## 2024-06-20 RX ADMIN — ACETAMINOPHEN 650 MG: 325 TABLET ORAL at 19:39

## 2024-06-20 RX ADMIN — LOSARTAN POTASSIUM 25 MG: 25 TABLET, FILM COATED ORAL at 08:46

## 2024-06-20 RX ADMIN — PANTOPRAZOLE SODIUM 40 MG: 40 TABLET, DELAYED RELEASE ORAL at 06:32

## 2024-06-20 RX ADMIN — EMPAGLIFLOZIN 10 MG: 10 TABLET, FILM COATED ORAL at 19:16

## 2024-06-20 RX ADMIN — ACETAMINOPHEN 650 MG: 325 TABLET ORAL at 12:34

## 2024-06-20 ASSESSMENT — PAIN SCALES - GENERAL
PAINLEVEL_OUTOF10: 0
PAINLEVEL_OUTOF10: 0
PAINLEVEL_OUTOF10: 3
PAINLEVEL_OUTOF10: 0
PAINLEVEL_OUTOF10: 3
PAINLEVEL_OUTOF10: 0

## 2024-06-20 ASSESSMENT — PAIN DESCRIPTION - LOCATION
LOCATION: HEAD
LOCATION: GENERALIZED

## 2024-06-20 ASSESSMENT — PAIN DESCRIPTION - ORIENTATION
ORIENTATION: ANTERIOR
ORIENTATION: ANTERIOR

## 2024-06-20 ASSESSMENT — PAIN - FUNCTIONAL ASSESSMENT: PAIN_FUNCTIONAL_ASSESSMENT: ACTIVITIES ARE NOT PREVENTED

## 2024-06-20 NOTE — ED NOTES
ED TO INPATIENT SBAR HANDOFF    Patient Name: Yudith Park   :  1975  48 y.o.   MRN:  [id number]  ED Room #:  ER22/22  Family/Caregiver Present no       Situation  Code Status: Full Code     Allergies: Patient has no known allergies.  Weight: Patient Vitals for the past 96 hrs (Last 3 readings):   Weight   24 1108 102.8 kg (226 lb 10.1 oz)     Arrived from: home  Chief Complaint:   Chief Complaint   Patient presents with    Chest Pain    Abdominal Pain     [facility] Problem/Diagnosis:  Principal Problem:    New onset of congestive heart failure (HCC)  Resolved Problems:    * No resolved hospital problems. *    Imaging:   NM HEPATOBILIARY SCAN W PHARMACOLOGICAL INTERVENTION   Final Result   There is no emptying of the gallbladder after CCK administration.      Electronically signed by Sandip Westfall      CTA CHEST W WO CONTRAST   Final Result      1. No evidence for acute pulmonary embolism.   2. Edema with small right pleural effusion.   3. 5 mm right lung nodule.   4. Mildly enlarged paratracheal mediastinal lymph nodes.   5. Right heart enlargement and dysfunction.      Guidelines by the Fleischner society (Radiology 2017 special report) suggest   that patients with low risk for lung cancer and solid nodule(s) less than or   equal to 6 mm in diameter require no follow-up.  In patients with a higher risk,   such as smokers, follow-up noncontrast chest CT should be considered at 12   months.  Patients with a known malignancy are at increased risk for metastasis   and should receive a three month follow-up.                  Electronically signed by Adam Ferreira      US ABDOMEN LIMITED Specify organ? GALLBLADDER, LIVER, PANCREAS   Final Result   No cholelithiasis. Nonspecific gallbladder wall thickening, and reported   sonographic South sign. No biliary dilatation. Trace right pleural effusion.         Electronically signed by Phil Connolly      XR CHEST (2 VW)   Final Result   Cardiomegaly. Mild

## 2024-06-20 NOTE — PROGRESS NOTES
0730: Charting and patient care of Yudith Park by calista MONTANO from [facility] to SHARON Worley  was supervised and reviewed by this RN. All patient care and documentation was directly  observed by SHARON Worley.

## 2024-06-20 NOTE — PROGRESS NOTES
NUTRITION     Nutrition screening referral was triggered based on results obtained during nursing admission assessment for weight loss and eating poorly PTA.    The patient's chart was reviewed and visited with pt.  Pt very pleasant and denies being asked these questions directly.  She states she is sure she can eat better, but doesn't feel she was eating inadequately PTA.  States she was served pork for dinner last night and doesn't eat pork, so perhaps this why is was reported she was eating poorly.  However, she notes her mother went and got her food from the cafeteria (asked for salad, but they were out, so only had fried chicken option).  Pt states she doesn't eat like that usually - makes everything at a home.  Usually ground turkey or chicken.  Avoids salt and fried foods.  Rarely eats out.  She does note the CP she has been experiencing over the past year and had made dietary changes to see if this would help - down to just eating liquids at one point and then reintroducing solids gradually.  She went from 230 lb to 210 lb (standing scale at home PTA), but not specific about timeframe of weight loss.  However, she states she would consider this intentional as she was knowingly making dietary changes.  She is well-developed, well-nourished.  Currently 224 lb and being diuresed.  I asked if she was a diabetic d/t the consistent CHO diet she is on. She stated she wasn't sure, but previously told she was pre-diabetic and was on Metformin.  However, HbA1C on admission = 5% and WNL.  This may have improved with weight loss.  No indication for consistent CHO diet at this time, but I did discussed CHF and need for low Na+ diet which she is somewhat familiar with.    Will adjusted diet from consistent CHO to regular, MOE.  Continue No beef, No pork.     No overt malnutrition concerns identified/ nutrition assessment is not indicated at this time.  Plan to see patient for rescreen as indicated or per consult.  Thank

## 2024-06-20 NOTE — CONSULTS
[facility]  [facility] in [place], [place]  Inpatient Progress Note      Patient name: Yudith Park  Patient : 1975  Patient MRN: [id number]  Consulting MD: Bro Shi MD  Date of service: 24    REASON FOR REFERRAL:  Acute systolic heart failure    ASSESSMENT:  Yudith Park is a 48 y.o. female admitted with acute systolic heart failure. Recent coronary CTA showing normal coronary arteries. Non ischemic cardiomyopathy. NYHA class IV symptoms on presentation.     RECOMMENDATIONS:  Medical Therapy for Heart Failure  She has been started on Milrinone 0.125 mcg/kg/min with improvement in symptoms. Will optimize GDMT, evaluate hemodynamics with RHC and if able plan to wean prior to discharge  Beta-blocker: Will initiate after RHC  ACEi/ARB/ARNI: Start Entresto 24-26 mg BID tomorrow  Hydralazine/Nitrate: Hold Hydralazine/Nitrate until ARNI and beta blocker are maximized  MRA: Plan to start Spironolactone tomorrow  SGLT2i: Start Jardiance 10 mg daily    Volume Management  Received Lasix 20 mg once today with brisk response  RHC tomorrow and will re dose diuretics pending hemodynamics  Keep K>4 and Mg>2  Fluid restriction to 2000 cc daily, strict I/Os, daily standing weight    Laboratory and Imaging Studies  Echo reviewed  ECG completed but not uploaded in epic  Labs: BMP, NT pro-BNP daily. Check iron profile with ferritin, vitamin D level, FAN    Physical activity and education  Ambulate daily  Nutritionist consult  Heart failure education by nurse navigator  Advanced care plan and document POA    Plans at or after hospital discharge  [facility] on discharge  Schedule sleep study  Outpatient genetic testing  Outpatient cardiac MRI  Follow-up in [facility] within 7 days from discharge    HISTORY OF PRESENT ILLNESS:  I had the pleasure of seeing Yudith Park at the request of Dr. Hernandez for evaluation and management of new heart failure.    Yudith Park is a 48 y.o. female with a  chloride (KLOR-CON) extended release tablet 40 mEq, 40 mEq, Oral, PRN **OR** potassium bicarb-citric acid (EFFER-K) effervescent tablet 40 mEq, 40 mEq, Oral, PRN **OR** potassium chloride 10 mEq/100 mL IVPB (Peripheral Line), 10 mEq, IntraVENous, PRN, Libby Montilla MD    magnesium sulfate 2000 mg in 50 mL IVPB premix, 2,000 mg, IntraVENous, PRN, Libby Montilla MD    enoxaparin Sodium (LOVENOX) injection 30 mg, 30 mg, SubCUTAneous, BID, Libby Montilla MD, 30 mg at 06/19/24 2045    ondansetron (ZOFRAN-ODT) disintegrating tablet 4 mg, 4 mg, Oral, Q8H PRN **OR** ondansetron (ZOFRAN) injection 4 mg, 4 mg, IntraVENous, Q6H PRN, Libby Montilla MD    polyethylene glycol (GLYCOLAX) packet 17 g, 17 g, Oral, Daily PRN, Libby Montilla MD    acetaminophen (TYLENOL) tablet 650 mg, 650 mg, Oral, Q6H PRN, 650 mg at 06/20/24 1234 **OR** acetaminophen (TYLENOL) suppository 650 mg, 650 mg, Rectal, Q6H PRN, Libby Montilla MD    morphine (PF) injection 2 mg, 2 mg, IntraVENous, Q4H PRN, Libby Montilla MD    oxyCODONE (ROXICODONE) immediate release tablet 5 mg, 5 mg, Oral, Q4H PRN, Libby Montilla MD    nitroGLYCERIN (NITROSTAT) SL tablet 0.4 mg, 0.4 mg, SubLINGual, Q5 Min PRN, Libby Montilla MD    ipratropium 0.5 mg-albuterol 2.5 mg (DUONEB) nebulizer solution 1 Dose, 1 Dose, Inhalation, Q4H PRN, Libby Montilla MD    nicotine (NICODERM CQ) 21 MG/24HR 1 patch, 1 patch, TransDERmal, Daily, Libby Montilla MD, 1 patch at 06/20/24 0846    milrinone (PRIMACOR) 20 mg in dextrose 5 % 100 mL infusion, 0.0625-0.75 mcg/kg/min, IntraVENous, Continuous, Niko Hernandez MD, Last Rate: 3.6 mL/hr at 06/20/24 0042, 0.125 mcg/kg/min at 06/20/24 0042    hydrALAZINE (APRESOLINE) tablet 25 mg, 25 mg, Oral, BID, Niko Hernandez MD, 25 mg at 06/20/24 0846    isosorbide dinitrate (ISORDIL) tablet 20 mg, 20 mg, Oral, BID, Niko Hernandez MD, 20 mg at 06/20/24 1508    PATIENT CARE TEAM:  No care team member to display

## 2024-06-20 NOTE — PROGRESS NOTES
Hospitalist Progress Note  Bro Shi MD  Answering service: [phone]        Date of Service:  2024  NAME:  Yudith Park  :  1975  MRN:  [id number]      Admission Summary:     Patient admitted with new onset CHF.    Interval history / Subjective:     Patient states that her breathing is better today.     Assessment & Plan:     Congestive heart failure  -Acute HFrEF, NYHA class III, echo shows EF of 10 to 15% with severe global hypokinesis and grade 3 diastolic dysfunction  -Started on milrinone drip on admission for inotropic support, diuresis is not on hold due to collapsible IVC  -On hydralazine and Isordil, on losartan, timing for addition of other GDMT as per cardiology    Atypical chest pain  -CTA negative for PE or acute pathology  -Concern for gallbladder wall thickening on ultrasound with sonographic South sign  -HIDA shows no emptying of gallbladder  -General surgery consult for further evaluation    Hypertension  -Continue hydralazine and Isordil  -Monitor blood pressure    Diabetes type 2  -Continue insulin sliding scale coverage    Dyslipidemia  -Continue statin    Lung nodule  -Incidental finding of 5 mm right lung nodule on CT of the chest  -Pulmonology consult for further evaluation    Nicotine dependence  -On nicotine patch     Code status: Full  Prophylaxis: SCDs  Care Plan discussed with: Patient  Anticipated Disposition: 24 to 48 hours  Central Line:   None             Review of Systems:   Pertinent items are noted in HPI.         Vital Signs:    Last 24hrs VS reviewed since prior progress note. Most recent are:  Vitals:    24 0718   BP: 106/71   Pulse: 57   Resp: 18   Temp: 97.7 °F (36.5 °C)   SpO2: 97%         Intake/Output Summary (Last 24 hours) at 2024 0812  Last data filed at 2024 0722  Gross per 24 hour   Intake 1363.05 ml   Output 400 ml   Net 963.05 ml

## 2024-06-20 NOTE — CONSULTS
Pulmonary, Critical Care, and Sleep Medicine~Consult Note    Name: Yudith Park MRN: [id number]   : 1975 Hospital: [facility]   Date: 2024 1:39 PM Admission: 2024     Impression Plan   Abnormal CT scan: Suspect the 1.7 cm paratracheal lymph node is due to heart failure.  Very small 5 mm right upper lobe nodule was noted.  Acute on chronic systolic/diastolic heart failure.  EF 10 to 15% with evidence of nonischemic cardiomyopathy.  Former smoker Noted cardiology plans  DVT prophylaxis  Ongoing diuretics  O2 titration above 90%  Will follow-up with the CT scan in outpatient basis.     Daily Progression:    Consult Note requested by hospitalist service.   Patient presented for admission on 2024 secondary to worsening shortness of breath and additionally she had chest pains.  She had an elevated BNP.  CTA was done and showed pulmonary edema with small right pleural effusion and a 5 mm right upper lobe nodule.  Additionally she did have a paratracheal lymph node that was 1.7 cm.  On CT she had evidence of right heart strain.  Echo was done and showed an EF of 10 to 15% with severe dilated left ventricle.  Grade 3 diastolic dysfunction.  Mitral valve is mild to moderate regurgitation.  RVSP was 58 mmHg.  She does have a history of smoking and was smoking up until this visit to the hospital.  No history of asthma.  No history of prior pulmonary disease.     FINDINGS:     No evidence for acute pulmonary embolism.   5 mm right upper lobe lung nodule. Bilateral groundglass opacities with septal thickening likely edema. Minimal bibasilar atelectasis. The central airways are patent.  Small right pleural effusion.  No pericardial effusion.  Enlarged paratracheal lymph nodes measuring up to 1.7 cm.  CORONARY ARTERY CALCIFICATIONS: Absent  Moderate cardiomegaly with right atrial enlargement. Reflux of contrast into the hepatic veins.  IMPRESSION:     1. No evidence for acute pulmonary

## 2024-06-20 NOTE — PROGRESS NOTES
[facility] Cardiology Progress Note    Usual Cardiologist: Dr. Loni Edouard  Date of Service: 2024      Assessment/Recommendations:    New diagnosis of acute combined systolic and diastolic congestive heart failure with EF 10 to 15%, right heart failure, probable nonischemic cardiomyopathy of unclear etiology, NYHA class IV on admission  Possible etiologies include hypertension or genetic cardiomyopathy  Symptoms are improved on milrinone 0.125 mcg/kg/min  Plan for right heart catheterization tomorrow morning at 7:30 AM; please keep n.p.o. after midnight  Consult placed to advanced heart failure; discussed case with Dr. Wallace  Despite diuresis with furosemide 20 mg IV once today; IVC was normal diameter with normal inspiratory collapse on TTE done on 2024  CCTA 3/29/24 showed normal coronary arteries  Continue hydralazine 25 mg twice daily, isosorbide dinitrate 20 mg twice daily, losartan 25 mg once daily  Plan for eventual transition of losartan to Entresto, initiation of mineralocorticoid receptor antagonist and SGLT2i  Cardiomegaly  Tricuspid regurgitation  Right pleural effusion  Chest/RUQ pain  Suspect this is related to liver congestion and liver capsule stretch  Evaluation as above  N/V, diarrhea  Dyspnea  HTN  HF medications as above  LBBB (chronic)  Would be candidate for CRT-D pending EF after optimizing HF GDMT  Current smoker  She plans to quit    Case discussed with Dr. Wallace of advanced heart failure.    Thank you for the opportunity to participate in the care of Yudith Park and please do not hesitate to contact us should you have any questions.    Subjective:  No acute events overnight.  Feels improved since initiation of milrinone at 0.125 mg micrograms per kilogram per minute.  Denies chest discomfort or dyspnea walking around the room.  Denies lightheadedness.  Blood pressures are better controlled.  It seems that not all urine output has been charted.    Objective:    Temp (24hrs), Av.1

## 2024-06-20 NOTE — CARE COORDINATION
Care Management Initial Assessment       RUR: 9%  Readmission? No  1st IM letter given? No  1st  letter given: No    Initial assessment completed by peer, Mitzy Cruz.  She her cmtoc note for more details.  Per note email sent to [facility] for assistance w finding a PCP. Patient lives 3 hrs away.     CM will continue to follow for JENNIFER needs.     06/19/24 8649   Service Assessment   Patient Orientation Alert and Oriented   Cognition Alert   History Provided By Patient   Primary Caregiver Self   Accompanied By/Relationship Mother Lakisha Park [phone]   Support Systems Parent   Patient's Healthcare Decision Maker is: Legal Next of Kin   PCP Verified by CM No  (Pt requesting CM specialist make new PCP appointemnt)   Prior Functional Level Independent in ADLs/IADLs   Current Functional Level Independent in ADLs/IADLs   Can patient return to prior living arrangement Yes   Ability to make needs known: Good   Family able to assist with home care needs: Yes   Would you like for me to discuss the discharge plan with any other family members/significant others, and if so, who? No   Social/Functional History   Lives With Family  (Mother)   Type of Home House   Home Layout One level   Home Access Stairs to enter with rails   Entrance Stairs - Number of Steps 3   Active  Yes   Discharge Planning   Patient expects to be discharged to: House   Services At/After Discharge   Transition of Care Consult (CM Consult) Other  (New PCP)

## 2024-06-20 NOTE — PROGRESS NOTES
0040: Receive report from [facility] on patient being transferred to [facility] 467 for closer monitoring at this time.  Vital signs taken and patient connected to telemetry patient resting in chair without any complaints at this time.  0042: Milrinone gtt verified with SHARON Evans    0730: Bedside and Verbal shift change report given to SHARON Rodarte  (oncoming nurse) by SHARON Worley  (offgoing nurse). Report included the following information Nurse Handoff Report, Index, Intake/Output, MAR, Recent Results, Med Rec Status, and Cardiac Rhythm NSR .

## 2024-06-20 NOTE — NURSE NAVIGATOR
HEART FAILURE NURSE NAVIGATOR NOTE  [facility]    Patient chart was reviewed by Heart Failure (HF) Nurse Navigators for compliance of prescribed treatment with guidelines directed medical therapy (GDMT) and HF database completed.     Please, review beneath recommendations for symptomatic patients with HF with Reduced Ejection Fraction ? 40% (HFrEF)* and patients whose LVEF improved > 40% (HFimpEF)* for your consideration when taking care of this patient.    Current Medical Therapy:    Name Yudith Park    1975   LVEF  10/15%   NYHA Functional Class  IV   ARNi/ACEi/ARB  Cozaar   Beta-blocker *   Aldosterone Antagonist *   SGLT2 inhibitor *   Hydralazine/Isosorbide Dinitrate Hydralazine/ Isosorbide DInitrate   Consulting Cardiologist:   TONG/KM     Recommendations:    Please, add the following GDMT for HFrEF ? 40% [Class 1] or document in discharge summary/progress note why patient cannot take the medication:  ARNi/ACEi or ARB  Beta-blockers (carvedilol, sustained-release metoprolol succinate or bisoprolol)  Aldosterone antagonists GFR > 30 and K< 5  SGLT2 inhibitor  Hydralazine/Isosorbide dinitrate for  Americans with Class III/IV symptoms  Adjust diuretic dose at discharge if hospitalized for volume overload    Consider adding the following GDMT for HFrEF ? 40%, if appropriate [Class 2b]:  Ivabradine for patients on maximally tolerated beta-blocker dose in order to achieve HR 70-80bpm  Digoxin, goal level 0.5-0.9  Polyunsaturated fatty acids  Vericuguat  For patient with hyperkalemia while on RAASi > 5.5, consider adding potassium binders (patiromer, sodium zirconium cycosilicate)    Note: the following medications may be potentially harmful in heart failure [Class 3]:  Calcium channel blockers (doxazosin, diltiazem, verapamil, nifedipine)  Antiarrhythmics (flecanide, disopyrimide, sotalol, dronedarone)  Diabetes medications (thiasolidinediones, saxagliptin,

## 2024-06-20 NOTE — DISCHARGE INSTRUCTIONS
To access the American Heart Association's Interactive Workbook \"Healthier Living with Heart Failure - Managing Symptoms and Reducing Risk\"  Scan the QR code below.

## 2024-06-20 NOTE — PROGRESS NOTES
[facility]  Consult    Admit Date: 6/19/2024  Reason for Consultation: Abnormal gallbladder with Wall thicking    HPI:  Yudith Park is a 48 y.o. female with PMH of HTN, HLD, DM and as noted below who we are asked to see in Surgical consultation for Abnormal Gallbladder with wall thickening.   Per chart review, the patient presented to ED yesterday with CP & SOB that has been present for about a year, but increased in severity over the past month or so. She has tried taking Mylanta without help. She presented to urgent care in [place] over weekend with c/o RUQ pain a epigastric fullness. The provider suspected gall bladder issues and advised her to follow-up with ER at home. Due to persistent symptoms, she presented to [facility] ED.  Work -up revealed acute combined systolic and diastolic congestive heart failure with EF 10 to 15%, right heart failure, probable nonischemic cardiomyopathy of unclear etiology.    Abdominal ultrasound showed nonspecific findings (see below) and HIDA can ordered for further evaluation.   This afternoon she feels better with minimal RUQ pain.   She is scheduled for a right heart catheterization tomorrow.    Abd Ultrasound (6/19/2024)  IMPRESSION:  No cholelithiasis. Nonspecific gallbladder wall thickening, and reported  sonographic South sign. No biliary dilatation. Trace right pleural effusion.    HIDA (6/19/2024):  IMPRESSION:  There is no emptying of the gallbladder after CCK administration.    Patient Active Problem List    Diagnosis Date Noted    Dyskinesia of gallbladder 06/20/2024    New onset of congestive heart failure (HCC) 06/19/2024     No past medical history on file.   No past surgical history on file.   Social History     Tobacco Use    Smoking status: Former     Types: Cigarettes    Smokeless tobacco: Never   Substance Use Topics    Alcohol use: Not on file      No family history on file.   Prior to Admission medications    Medication Sig Start Date End Date

## 2024-06-20 NOTE — PROGRESS NOTES
Report given to [facility] nurse Brunilda at this time.    0037: Transferred to room 467 via w/c at this time without any s/s of distress or SOB. Pt thanked this RN.

## 2024-06-20 NOTE — PROGRESS NOTES
CARDIOLOGY NOTE    TTE personally reviewed:      Left Ventricle: Severely reduced left ventricular systolic function with a visually estimated EF of 10 -15%. Left ventricle is severely dilated. Mildly increased wall thickness. Severe global hypokinesis present. Grade III diastolic dysfunction with increased LAP.    Right Ventricle: Right ventricle is mildly dilated. Moderately to severely reduced systolic function.    Aortic Valve: Trileaflet valve.    Mitral Valve: Mild to moderate regurgitation.    Tricuspid Valve: Mild annular dilation. Moderately elevated RVSP, consistent with moderate pulmonary hypertension. The estimated RVSP is 58 mmHg.    Left Atrium: Left atrium is severely dilated.    Right Atrium: Right atrium is mildly dilated.    Pericardium: Small (<1 cm) pericardial effusion present. No indication of cardiac tamponade.    Image quality is excellent.      Start milrinone 0.125 mcg/kg/min for inotropic support.  Start hydralazine 25 mg BID for afterload reduction (BID dosing for ease of use).  Start Isordil 20 mg BID.  Continue losartan.  Eventually will add beta-blocker, MRA, SGLT2i and transition from losartan to Entresto.  Hold diuresis as IVC appears to be small and collapsible.  Monitor BP closely with inotropic support.  Transfer to CVSU for close monitoring.  Will need RHC to assess hemodynamics.  Coronary anatomy has already been evaluated on CCTA 3/2024 with normal coronaries, thus LHC is not necessary.     Consult ordered for AHF tomorrow.    Thank you for the opportunity to participate in the care of Yudith Park and please do not hesitate to contact us should you have any questions.    Niko Hernandez MD  Structural / Interventional Cardiology  [facility]  06/19/24

## 2024-06-20 NOTE — PROGRESS NOTES
Per [facility] approved formulary policy.     SGLT2's are only formulary with the indication of CKD or CHF therefore:    Empadliflozin 10 mg (jardiance) will be administered during this admission for the diagnosis of Heart Failure (reduced EF)      Please contact the pharmacy with any questions or concerns.  Thank you.  Meri Kraft RPH, RPgregory/PharmD 6/20/2024 5:19 PM

## 2024-06-20 NOTE — CONSULTS
Noted patient on AHF list today.   Will see on rounds this morning.  Full note to follow.  Please view progress note dated today.        Shauna Lucero NP  DNP, RN, [facility]  Advanced Heart Failure Center  [facility]  [address], [address]  Phone: [phone]

## 2024-06-20 NOTE — PROCEDURES
Vascular Access Team - Two peripheral IV catheter insertion notes     RUE PIV  A 22 gauge, 45 mm (1.75 inch) PIV was inserted into the right ventral forearm using ultrasound guidance. The IV flushed easily and had brisk blood return.          LUE PIV  A 20 gauge, 45 mm (1.75 inch) PIV was inserted into the left ventral forearm using ultrasound guidance. The IV flushed easily and had brisk blood return.         The patient tolerated the procedures well.   Sandip Chan RN

## 2024-06-21 LAB
25(OH)D3 SERPL-MCNC: 17.3 NG/ML (ref 30–100)
ANION GAP SERPL CALC-SCNC: 7 MMOL/L (ref 5–15)
BUN SERPL-MCNC: 18 MG/DL (ref 6–20)
BUN/CREAT SERPL: 21 (ref 12–20)
CALCIUM SERPL-MCNC: 8.8 MG/DL (ref 8.5–10.1)
CHLORIDE SERPL-SCNC: 110 MMOL/L (ref 97–108)
CO2 SERPL-SCNC: 24 MMOL/L (ref 21–32)
CREAT SERPL-MCNC: 0.84 MG/DL (ref 0.55–1.02)
ECHO BSA: 2.11 M2
FERRITIN SERPL-MCNC: 99 NG/ML (ref 8–252)
GLUCOSE SERPL-MCNC: 113 MG/DL (ref 65–100)
IRON SATN MFR SERPL: 30 % (ref 20–50)
IRON SERPL-MCNC: 125 UG/DL (ref 35–150)
NT PRO BNP: 459 PG/ML
POTASSIUM SERPL-SCNC: 4 MMOL/L (ref 3.5–5.1)
SODIUM SERPL-SCNC: 141 MMOL/L (ref 136–145)
TIBC SERPL-MCNC: 419 UG/DL (ref 250–450)

## 2024-06-21 PROCEDURE — 6370000000 HC RX 637 (ALT 250 FOR IP): Performed by: INTERNAL MEDICINE

## 2024-06-21 PROCEDURE — 6360000002 HC RX W HCPCS: Performed by: INTERNAL MEDICINE

## 2024-06-21 PROCEDURE — C1725 CATH, TRANSLUMIN NON-LASER: HCPCS | Performed by: INTERNAL MEDICINE

## 2024-06-21 PROCEDURE — 83540 ASSAY OF IRON: CPT

## 2024-06-21 PROCEDURE — 80048 BASIC METABOLIC PNL TOTAL CA: CPT

## 2024-06-21 PROCEDURE — 83550 IRON BINDING TEST: CPT

## 2024-06-21 PROCEDURE — 2500000003 HC RX 250 WO HCPCS: Performed by: INTERNAL MEDICINE

## 2024-06-21 PROCEDURE — 99024 POSTOP FOLLOW-UP VISIT: CPT | Performed by: INTERNAL MEDICINE

## 2024-06-21 PROCEDURE — 4A023N6 MEASUREMENT OF CARDIAC SAMPLING AND PRESSURE, RIGHT HEART, PERCUTANEOUS APPROACH: ICD-10-PCS | Performed by: INTERNAL MEDICINE

## 2024-06-21 PROCEDURE — C1894 INTRO/SHEATH, NON-LASER: HCPCS | Performed by: INTERNAL MEDICINE

## 2024-06-21 PROCEDURE — 2580000003 HC RX 258: Performed by: INTERNAL MEDICINE

## 2024-06-21 PROCEDURE — 82306 VITAMIN D 25 HYDROXY: CPT

## 2024-06-21 PROCEDURE — 76937 US GUIDE VASCULAR ACCESS: CPT | Performed by: INTERNAL MEDICINE

## 2024-06-21 PROCEDURE — C1713 ANCHOR/SCREW BN/BN,TIS/BN: HCPCS | Performed by: INTERNAL MEDICINE

## 2024-06-21 PROCEDURE — 93451 RIGHT HEART CATH: CPT | Performed by: INTERNAL MEDICINE

## 2024-06-21 PROCEDURE — 2709999900 HC NON-CHARGEABLE SUPPLY: Performed by: INTERNAL MEDICINE

## 2024-06-21 PROCEDURE — 2060000000 HC ICU INTERMEDIATE R&B

## 2024-06-21 PROCEDURE — 82728 ASSAY OF FERRITIN: CPT

## 2024-06-21 PROCEDURE — 36415 COLL VENOUS BLD VENIPUNCTURE: CPT

## 2024-06-21 PROCEDURE — 83880 ASSAY OF NATRIURETIC PEPTIDE: CPT

## 2024-06-21 PROCEDURE — 86038 ANTINUCLEAR ANTIBODIES: CPT

## 2024-06-21 RX ORDER — VITAMIN B COMPLEX
2000 TABLET ORAL DAILY
Status: DISCONTINUED | OUTPATIENT
Start: 2024-06-21 | End: 2024-06-25 | Stop reason: HOSPADM

## 2024-06-21 RX ORDER — ACETAMINOPHEN 325 MG/1
650 TABLET ORAL EVERY 4 HOURS PRN
Status: DISCONTINUED | OUTPATIENT
Start: 2024-06-21 | End: 2024-06-25 | Stop reason: HOSPADM

## 2024-06-21 RX ORDER — SODIUM CHLORIDE 0.9 % (FLUSH) 0.9 %
5-40 SYRINGE (ML) INJECTION PRN
Status: DISCONTINUED | OUTPATIENT
Start: 2024-06-21 | End: 2024-06-25 | Stop reason: HOSPADM

## 2024-06-21 RX ORDER — SODIUM CHLORIDE 0.9 % (FLUSH) 0.9 %
5-40 SYRINGE (ML) INJECTION EVERY 12 HOURS SCHEDULED
Status: DISCONTINUED | OUTPATIENT
Start: 2024-06-21 | End: 2024-06-25 | Stop reason: HOSPADM

## 2024-06-21 RX ORDER — SODIUM CHLORIDE 9 MG/ML
INJECTION, SOLUTION INTRAVENOUS PRN
Status: DISCONTINUED | OUTPATIENT
Start: 2024-06-21 | End: 2024-06-25 | Stop reason: HOSPADM

## 2024-06-21 RX ORDER — SPIRONOLACTONE 25 MG/1
25 TABLET ORAL DAILY
Status: DISCONTINUED | OUTPATIENT
Start: 2024-06-21 | End: 2024-06-25 | Stop reason: HOSPADM

## 2024-06-21 RX ORDER — FUROSEMIDE 10 MG/ML
40 INJECTION INTRAMUSCULAR; INTRAVENOUS 2 TIMES DAILY
Status: DISCONTINUED | OUTPATIENT
Start: 2024-06-21 | End: 2024-06-22

## 2024-06-21 RX ORDER — LIDOCAINE HYDROCHLORIDE 10 MG/ML
INJECTION, SOLUTION INFILTRATION; PERINEURAL PRN
Status: DISCONTINUED | OUTPATIENT
Start: 2024-06-21 | End: 2024-06-21 | Stop reason: HOSPADM

## 2024-06-21 RX ORDER — ENOXAPARIN SODIUM 100 MG/ML
40 INJECTION SUBCUTANEOUS DAILY
Status: DISCONTINUED | OUTPATIENT
Start: 2024-06-22 | End: 2024-06-25 | Stop reason: HOSPADM

## 2024-06-21 RX ADMIN — EMPAGLIFLOZIN 10 MG: 10 TABLET, FILM COATED ORAL at 09:33

## 2024-06-21 RX ADMIN — SODIUM CHLORIDE, PRESERVATIVE FREE 10 ML: 5 INJECTION INTRAVENOUS at 09:36

## 2024-06-21 RX ADMIN — SODIUM CHLORIDE, PRESERVATIVE FREE 10 ML: 5 INJECTION INTRAVENOUS at 20:21

## 2024-06-21 RX ADMIN — Medication 2000 UNITS: at 09:34

## 2024-06-21 RX ADMIN — MILRINONE LACTATE 0.13 MCG/KG/MIN: 0.2 INJECTION, SOLUTION INTRAVENOUS at 20:19

## 2024-06-21 RX ADMIN — ENOXAPARIN SODIUM 30 MG: 100 INJECTION SUBCUTANEOUS at 09:34

## 2024-06-21 RX ADMIN — ACETAMINOPHEN 650 MG: 325 TABLET ORAL at 05:03

## 2024-06-21 RX ADMIN — SPIRONOLACTONE 25 MG: 25 TABLET ORAL at 09:34

## 2024-06-21 RX ADMIN — FUROSEMIDE 40 MG: 10 INJECTION, SOLUTION INTRAMUSCULAR; INTRAVENOUS at 09:35

## 2024-06-21 RX ADMIN — FUROSEMIDE 40 MG: 10 INJECTION, SOLUTION INTRAMUSCULAR; INTRAVENOUS at 18:40

## 2024-06-21 RX ADMIN — PANTOPRAZOLE SODIUM 40 MG: 40 TABLET, DELAYED RELEASE ORAL at 05:04

## 2024-06-21 RX ADMIN — SACUBITRIL AND VALSARTAN 1 TABLET: 24; 26 TABLET, FILM COATED ORAL at 20:20

## 2024-06-21 RX ADMIN — SACUBITRIL AND VALSARTAN 1 TABLET: 24; 26 TABLET, FILM COATED ORAL at 09:34

## 2024-06-21 RX ADMIN — ACETAMINOPHEN 650 MG: 325 TABLET ORAL at 09:42

## 2024-06-21 ASSESSMENT — PAIN - FUNCTIONAL ASSESSMENT: PAIN_FUNCTIONAL_ASSESSMENT: ACTIVITIES ARE NOT PREVENTED

## 2024-06-21 ASSESSMENT — PAIN DESCRIPTION - LOCATION
LOCATION: HEAD
LOCATION: NECK

## 2024-06-21 ASSESSMENT — PAIN SCALES - GENERAL
PAINLEVEL_OUTOF10: 2
PAINLEVEL_OUTOF10: 0
PAINLEVEL_OUTOF10: 1
PAINLEVEL_OUTOF10: 6
PAINLEVEL_OUTOF10: 0
PAINLEVEL_OUTOF10: 3
PAINLEVEL_OUTOF10: 0
PAINLEVEL_OUTOF10: 0

## 2024-06-21 ASSESSMENT — PAIN DESCRIPTION - DESCRIPTORS: DESCRIPTORS: ACHING

## 2024-06-21 ASSESSMENT — PAIN DESCRIPTION - ORIENTATION: ORIENTATION: ANTERIOR

## 2024-06-21 NOTE — CARE COORDINATION
Transition of Care Plan:    RUR: 9%  Prior Level of Functioning: Lives w her mom, works, ind  Follow up appointments: FC  DME needed: Alfredo Zurita  Transportation at discharge: TBD  IM/IMM Medicare/ letter given:   Caregiver Contact: Mother Lakisha Park [phone]   Discharge Caregiver contacted prior to discharge?   Care Conference needed?   Barriers to discharge:  Medical, New [facility]      CM sent the [facility] referral to [facility].    Keena Steen MSW CCM  Care Management

## 2024-06-21 NOTE — PROGRESS NOTES
ADVANCED HEART FAILURE CENTER  [facility] in [place], [place]  Inpatient Progress Note      Patient name: Yudith Park  Patient : 1975  Patient MRN: [id number]  Consulting MD: Ike Schaefer MD  Date of service: 24    REASON FOR REFERRAL:  Acute systolic heart failure    INTERVAL HISTORY:  RHC this morning showed ongoing decompensated hemodynamics  Patients feels well and has been ambulating without dyspnea  No new complaints     ASSESSMENT:  Yudith Park is a 48 y.o. female admitted with acute systolic heart failure. Recent coronary CTA showing normal coronary arteries. Non ischemic cardiomyopathy. NYHA class IV symptoms on presentation.     RECOMMENDATIONS:  Medical Therapy for Heart Failure  Continue Milrinone 0.125 mcg/kg/min. CI remains low on RHC but SVR is also elevated, will titrate GDMT for afterload reduction  Beta-blocker: Hold until compensated and weaned off inotrope therapy, low output  ACEi/ARB/ARNI: Start Entresto 24-26 mg BID  Hydralazine/Nitrate: Hold Hydralazine/Nitrate until ARNI and beta blocker are maximized  MRA: Start Spironolactone 25 mg daily  SGLT2i: Start Jardiance 10 mg daily  Start Vitamin D supplementation    Volume Management  Started Lasix 40 mg IV BID for diuresis based on RHC results  Keep K>4 and Mg>2  Fluid restriction to 2000 cc daily, strict I/Os, daily standing weight    Laboratory and Imaging Studies  Echo reviewed  RHC reviewed  Labs: BMP, NT pro-BNP daily.     Physical activity and education  Ambulate daily  Nutritionist consult  Heart failure education by nurse navigator  Advanced care plan and document POA    Plans at or after hospital discharge  [facility] on discharge, ordered  Schedule sleep study  Outpatient genetic testing  Outpatient cardiac MRI  Follow-up in AHF Clinic within 7 days from discharge    HISTORY OF PRESENT ILLNESS:  I had the pleasure of seeing Yudith Park at the request of Dr. Hernandez for evaluation and management of new heart  failure.    Yudith Park is a 48 y.o. female with a history of HTN, HLD, and Diabetes. She is a smoker. She is admitted with acute systolic heart failure.    Patient reports a year ago she was seen in urgent care for bronchitis. She was noted on CXR to have cardiomegaly and was referred to cardiology. She reports cardiology evaluation, though it does not appear an echocardiogram was performed. She had a coronary CTA 3/29/2024 which by report showed normal coronary arteries. She reports over the last year having intermittent dyspnea on exertion with walking moderate distances or up stairs. Over the last 2 weeks she reports orthopnea and PND. She was seen in urgent care and reported chest and RUQ abdominal pain and was sent to the ER to evaluate for gallbladder disease. Labs on presentation showed proBNP of 1433, negative troponin. CTA showed signs of CHF and a right lung nodule. No PE. RUQ ultrasound showed no cholelithiasis but sonographic padilla sign. HIDA scan showed no emptying of the gallbladder. She was admitted for heart failure.    Echo on admission showed reduced EF of 10-15% with severely reduced RV function. Patient denies any significant alcohol intake. She is a smoker but plans to now quit. No drug use. No family history of heart failure on her mother's side. She does not know her father.       PROBLEM LIST:  Acute systolic heart failure  Stage C  Non ischemic cardiomyopathy  Normal coronary arteries on Cardiac CTA 3/29/24  HTN  DM  HLD  Lung Nodule  Dyskinesia of Gallbladder    LIFE GOALS:  Lifestyle goals reviewed with the patient.    IMAGING STUDIES REVIEWED:  Echo 6/19/24    Left Ventricle: Severely reduced left ventricular systolic function with a visually estimated EF of 10 -15%. Left ventricle is severely dilated. Mildly increased wall thickness. Severe global hypokinesis present. Grade III diastolic dysfunction with increased LAP.    Right Ventricle: Right ventricle is mildly dilated. Moderately

## 2024-06-21 NOTE — PROGRESS NOTES
PCCM:    VSS    Bnp 459    Plan:  Pulmonary nodule.   Follow up in in outpatient clinic in 1-2 wks    We will be available again to see if needed    Rakan Carrero PA-C

## 2024-06-21 NOTE — PROGRESS NOTES
CATH LAB to RECOVERY ROOM REPORT    Procedure: [facility]    MD: ELIJAH Hernandez MD    The procedure was diagnostic only.    Verbal Report given to Recovery Nurse on patient being transferred to Cardiac Cath Lab  for routine post-op. Patient stable upon transfer to .  Vitals, mental status, MAR, procedural summary discussed with recovery RN.    Medication given during procedure:    Contrast:0mL                          Lidocaine only      Sheaths:    Right internal jugular vein sheath pulled at 0819 am, secured with a band-aid.

## 2024-06-21 NOTE — PROGRESS NOTES
1930  Verbal bedside report given to SHARON Orozco by SHARON Mendoza. Report included updated vitals and SBAR. Patient is up in chair awake and respirations are even and unlabored. Milrinone verified at handoff.     1725  Moved patient to room 453.     0845  Patient arrived from cath lab. Patient rhythm NSR. Patient is in bed awake and respirations are even and unlabored. Call de la torre is within reach. Milrinone verified.     0835  Cath called; bringing patient upstairs.     0800  Verbal bedside report received from SHARON Orozco given to SHARON Mendoza. Report included vital signs, SBAR, and plan for the day.     Care Plan:    Problem: Safety - Adult  Goal: Free from fall injury  6/21/2024 1922 by Kelly Segura RN  Outcome: Progressing  6/21/2024 0639 by Pam Delgadillo RN  Outcome: Progressing     Problem: Discharge Planning  Goal: Discharge to home or other facility with appropriate resources  6/21/2024 1922 by Kelly Segura RN  Outcome: Progressing  6/21/2024 0639 by Pam Delgadillo RN  Outcome: Progressing     Problem: Pain  Goal: Verbalizes/displays adequate comfort level or baseline comfort level  6/21/2024 1922 by Kelly Segura RN  Outcome: Progressing  6/21/2024 0639 by Pam Delgadillo RN  Outcome: Progressing     Problem: Chronic Conditions and Co-morbidities  Goal: Patient's chronic conditions and co-morbidity symptoms are monitored and maintained or improved  6/21/2024 1922 by Kelly Segura RN  Outcome: Progressing  6/21/2024 0639 by Pam Delgadillo RN  Outcome: Progressing

## 2024-06-21 NOTE — PLAN OF CARE
Problem: Safety - Adult  Goal: Free from fall injury  6/21/2024 0639 by Pam Delgadillo RN  Outcome: Progressing  6/20/2024 2032 by Aster Kirkpatrick RN  Flowsheets (Taken 6/20/2024 2032)  Free From Fall Injury:   Instruct family/caregiver on patient safety   Based on caregiver fall risk screen, instruct family/caregiver to ask for assistance with transferring infant if caregiver noted to have fall risk factors     Problem: Discharge Planning  Goal: Discharge to home or other facility with appropriate resources  6/21/2024 0639 by Pam Delgadillo RN  Outcome: Progressing  6/20/2024 2032 by Aster Kirkpatrick RN  Outcome: Progressing  Flowsheets  Taken 6/20/2024 2032  Discharge to home or other facility with appropriate resources:   Arrange for needed discharge resources and transportation as appropriate   Identify barriers to discharge with patient and caregiver   Identify discharge learning needs (meds, wound care, etc)   Arrange for interpreters to assist at discharge as needed  Taken 6/20/2024 0750  Discharge to home or other facility with appropriate resources:   Identify barriers to discharge with patient and caregiver   Arrange for needed discharge resources and transportation as appropriate   Identify discharge learning needs (meds, wound care, etc)     Problem: Pain  Goal: Verbalizes/displays adequate comfort level or baseline comfort level  6/21/2024 0639 by Pam Delgadillo RN  Outcome: Progressing  6/20/2024 2032 by Aster Kirkpatrick RN  Outcome: Progressing  Flowsheets (Taken 6/20/2024 2032)  Verbalizes/displays adequate comfort level or baseline comfort level:   Encourage patient to monitor pain and request assistance   Assess pain using appropriate pain scale   Administer analgesics based on type and severity of pain and evaluate response     Problem: Chronic Conditions and Co-morbidities  Goal: Patient's chronic conditions and co-morbidity symptoms are monitored and maintained or

## 2024-06-21 NOTE — PROGRESS NOTES
Hospitalist Progress Note  Ike Schaefer MD  Answering service: [phone]        Date of Service:  2024  NAME:  Yudith Park  :  1975  MRN:  [id number]      Admission Summary:     Patient admitted with new onset CHF.    Interval history / Subjective:   Follow up CHF  S/p RHC  am  Feels some SOB     Assessment & Plan:     Acute new HFrEF, NYHA class III, echo shows EF of 10 to 15% with severe global hypokinesis and grade 3 diastolic dysfunction  -Started on milrinone drip on admission for inotropic support  -s/p RHC , awaiting report   -restarted IV diuresis   -On Entresto/aldactone/Jardiance, timing for addition of other GDMT as per cardiology    Atypical chest pain  Symptomatic cholelithiasis  -CTA negative for PE or acute pathology  -Concern for gallbladder wall thickening on ultrasound with sonographic South sign  -HIDA shows no emptying of gallbladder  -Appreciate Surgery, cardiology clearance for lap leola as outpatient    Hypertension: on Entresto/aldactone  Diabetes type 2: Continue insulin sliding scale coverage  Dyslipidemia: Continue statin  Lung nodule: Outpatient follow up in clinic in 1-2 weeks  Nicotine dependence: On nicotine patch     Code status: Full  Prophylaxis: SCDs    Plan: IV diuresis,   Care Plan discussed with: Patient  Anticipated Disposition: 24 to 48 hours  Central Line:   None             Review of Systems:   Pertinent items are noted in HPI.         Vital Signs:    Last 24hrs VS reviewed since prior progress note. Most recent are:  Vitals:    24 1200   BP:    Pulse: 91   Resp:    Temp:    SpO2:          Intake/Output Summary (Last 24 hours) at 2024 1332  Last data filed at 2024 1140  Gross per 24 hour   Intake 1275.6 ml   Output 5205 ml   Net -3929.4 ml          Physical Examination:     I had a face to face encounter with this patient and  independently examined them on 6/21/2024 as outlined below:          General : alert x 3, awake, no acute distress,   HEENT: PEERL, EOMI, moist mucus membrane, TM clear  Neck: supple, no JVD, no meningeal signs  Chest: Clear to auscultation bilaterally   CVS: S1 S2 heard, Capillary refill less than 2 seconds  Abd: soft/ non tender, non distended, BS physiological,   Ext: no clubbing, no cyanosis, no edema, brisk 2+ DP pulses  Neuro/Psych: pleasant mood and affect, CN 2-12 grossly intact, sensory grossly within normal limit, Strength 5/5 in all extremities, DTR 1+ x 4  Skin: warm            Data Review:    Review and/or order of clinical lab test    I have independently reviewed and interpreted patient's lab and all other diagnostic data    Notes reviewed from all clinical/nonclinical/nursing services involved in patient's clinical care. Care coordination discussions were held with appropriate clinical/nonclinical/ nursing providers based on care coordination needs.     Labs:     Recent Labs     06/19/24  0747 06/20/24  0415   WBC 9.1 8.3   HGB 14.1 12.3   HCT 43.3 38.8    148*       Recent Labs     06/19/24  0747 06/20/24  0415 06/21/24  0455    139 141   K 3.9 3.5 4.0   * 112* 110*   CO2 24 22 24   BUN 19 23* 18   MG  --  2.2  --    PHOS  --  3.1  --        Recent Labs     06/19/24  0747 06/20/24  0415   ALT 71 66   GLOB 3.8 3.1       No results for input(s): \"INR\", \"APTT\" in the last 72 hours.    Invalid input(s): \"PTP\"   Recent Labs     06/21/24  0455   TIBC 419      No results found for: \"RBCF\"   No results for input(s): \"PH\", \"PCO2\", \"PO2\" in the last 72 hours.  No results for input(s): \"CPK\" in the last 72 hours.    Invalid input(s): \"CPKMB\", \"CKNDX\", \"TROIQ\"  Lab Results   Component Value Date/Time    CHOL 82 06/20/2024 04:15 AM    HDL 19 06/20/2024 04:15 AM    LDL 47 06/20/2024 04:15 AM     No results found for: \"GLUCPOC\"  [unfilled]      Medications Reviewed:     Current

## 2024-06-21 NOTE — PROGRESS NOTES
Lovenox Monitoring  Indication: DVT Prophylaxis  Recent Labs     06/19/24  0747 06/20/24  0415   HGB 14.1 12.3    148*     Current Weight: 100.8 kg  Est. CrCl = 100 ml/min  Current Dose: 30 mg subcutaneously every 12 hours.  Plan: Change to 40 mg SQ daily for weight < 101 kg

## 2024-06-21 NOTE — PROGRESS NOTES
Cardiac Cath Lab Procedure Area Arrival Note:    Yudith Park arrived to Cardiac Cath Lab, Procedure Area. Patient identifiers verified with NAME and DATE OF BIRTH. Procedure verified with patient. Consent forms verified. Allergies verified. Patient informed of procedure and plan of care. Questions answered with review. Patient voiced understanding of procedure and plan of care.    Patient on cardiac monitor, non-invasive blood pressure, SPO2 monitor. On RA .  IV of NS on pump at 25 ml/hr. Patient status doing well without problems. Patient is A&Ox 4. Patient reports no complaints.     Patient medicated during procedure with orders obtained and verified by Dr. Hernandez.    Refer to patients Cardiac Cath Lab PROCEDURE REPORT for vital signs, assessment, status, and response during procedure, printed at end of case. Printed report on chart or scanned into chart.

## 2024-06-21 NOTE — PROGRESS NOTES
Cath Lab Recovery Room Arrival Note    Patient arrived to Cardiac Cath/EP Lab Recovery Room for  RHC Procedure. Staff introduced to patient. Patient identifiers verified with Name and Date of Birth. Procedure verified with patient. Consent forms reviewed and signed by patient or authorized representative and verified. Allergies verified. Patient informed of procedure and plan of care. Questions answered with review.     Patient on cardiac monitor, non-invasive blood pressure, SPO2 monitor. On RA. Patient is A&Ox3. Patient reports NO CP, SOB with excursion.      0826-TRANSFER - IN Cath Lab RR REPORT:    Verbal report received from Keara on Yduith Park  being received from the Cardiac Cath Lab for routine progression of care. Report consisted of patient’s Situation, Background, Assessment and Recommendations(SBAR). Procedural summary and MAR reviewed with receiving nurse. Opportunity for questions and clarification was provided. Assessment completed upon patient’s arrival to Cardiac Cath Lab RECOVERY AREA and care assumed.       Cardiac Cath Lab Recovery Arrival Note:    Yudith Park arrived to [facility] recovery area.  Patient procedure= RHC. Patient on cardiac monitor, non-invasive blood pressure, SPO2 monitor. On RA.  IV  of  Milrinone on pump at .125 mcg/kg/min. Patient is A&Ox 3. Patient reports No CP SOB.    PROCEDURE SITE CHECK:    Procedure site: No bleeding and no hematoma, no pain/discomfort reported at procedure site.     No change in patient status. Continue to monitor patient and status.        0840-TRANSFER - OUT REPORT:    Verbal report given to Kelly on Yudith Park  being transferred to [facility] for routine progression of patient care       Report consisted of patient's Situation, Background, Assessment and   Recommendations(SBAR).     Information from the following report(s) Nurse Handoff Report, Intake/Output, MAR, Recent Results, Cardiac Rhythm SR, and Event Log was reviewed with the receiving nurse.

## 2024-06-21 NOTE — PROGRESS NOTES
1900: Pt ambulated in hallway multiple times, denies any dyspnea.         Care plan:    Problem: Safety - Adult  Goal: Free from fall injury  Flowsheets (Taken 6/20/2024 2032)  Free From Fall Injury:   Instruct family/caregiver on patient safety   Based on caregiver fall risk screen, instruct family/caregiver to ask for assistance with transferring infant if caregiver noted to have fall risk factors     Problem: Discharge Planning  Goal: Discharge to home or other facility with appropriate resources  Outcome: Progressing  Flowsheets  Taken 6/20/2024 2032  Discharge to home or other facility with appropriate resources:   Arrange for needed discharge resources and transportation as appropriate   Identify barriers to discharge with patient and caregiver   Identify discharge learning needs (meds, wound care, etc)   Arrange for interpreters to assist at discharge as needed  Taken 6/20/2024 0750  Discharge to home or other facility with appropriate resources:   Identify barriers to discharge with patient and caregiver   Arrange for needed discharge resources and transportation as appropriate   Identify discharge learning needs (meds, wound care, etc)     Problem: Pain  Goal: Verbalizes/displays adequate comfort level or baseline comfort level  Outcome: Progressing  Flowsheets (Taken 6/20/2024 2032)  Verbalizes/displays adequate comfort level or baseline comfort level:   Encourage patient to monitor pain and request assistance   Assess pain using appropriate pain scale   Administer analgesics based on type and severity of pain and evaluate response     Problem: Chronic Conditions and Co-morbidities  Goal: Patient's chronic conditions and co-morbidity symptoms are monitored and maintained or improved  Flowsheets  Taken 6/20/2024 2032  Care Plan - Patient's Chronic Conditions and Co-Morbidity Symptoms are Monitored and Maintained or Improved:   Monitor and assess patient's chronic conditions and comorbid symptoms for

## 2024-06-22 LAB
ANION GAP SERPL CALC-SCNC: 5 MMOL/L (ref 5–15)
BUN SERPL-MCNC: 23 MG/DL (ref 6–20)
BUN/CREAT SERPL: 23 (ref 12–20)
CALCIUM SERPL-MCNC: 9.4 MG/DL (ref 8.5–10.1)
CHLORIDE SERPL-SCNC: 109 MMOL/L (ref 97–108)
CO2 SERPL-SCNC: 24 MMOL/L (ref 21–32)
CREAT SERPL-MCNC: 1.02 MG/DL (ref 0.55–1.02)
GLUCOSE SERPL-MCNC: 128 MG/DL (ref 65–100)
NT PRO BNP: 586 PG/ML
POTASSIUM SERPL-SCNC: 3.5 MMOL/L (ref 3.5–5.1)
SODIUM SERPL-SCNC: 138 MMOL/L (ref 136–145)

## 2024-06-22 PROCEDURE — 6360000002 HC RX W HCPCS: Performed by: INTERNAL MEDICINE

## 2024-06-22 PROCEDURE — 36415 COLL VENOUS BLD VENIPUNCTURE: CPT

## 2024-06-22 PROCEDURE — 99233 SBSQ HOSP IP/OBS HIGH 50: CPT | Performed by: INTERNAL MEDICINE

## 2024-06-22 PROCEDURE — 2580000003 HC RX 258: Performed by: INTERNAL MEDICINE

## 2024-06-22 PROCEDURE — 6370000000 HC RX 637 (ALT 250 FOR IP): Performed by: INTERNAL MEDICINE

## 2024-06-22 PROCEDURE — 83880 ASSAY OF NATRIURETIC PEPTIDE: CPT

## 2024-06-22 PROCEDURE — 80048 BASIC METABOLIC PNL TOTAL CA: CPT

## 2024-06-22 PROCEDURE — 2060000000 HC ICU INTERMEDIATE R&B

## 2024-06-22 RX ORDER — FUROSEMIDE 10 MG/ML
40 INJECTION INTRAMUSCULAR; INTRAVENOUS DAILY
Status: DISCONTINUED | OUTPATIENT
Start: 2024-06-22 | End: 2024-06-23

## 2024-06-22 RX ADMIN — SACUBITRIL AND VALSARTAN 1 TABLET: 49; 51 TABLET, FILM COATED ORAL at 20:42

## 2024-06-22 RX ADMIN — FUROSEMIDE 40 MG: 10 INJECTION, SOLUTION INTRAMUSCULAR; INTRAVENOUS at 09:23

## 2024-06-22 RX ADMIN — Medication 2000 UNITS: at 09:23

## 2024-06-22 RX ADMIN — SODIUM CHLORIDE, PRESERVATIVE FREE 10 ML: 5 INJECTION INTRAVENOUS at 20:42

## 2024-06-22 RX ADMIN — EMPAGLIFLOZIN 10 MG: 10 TABLET, FILM COATED ORAL at 09:23

## 2024-06-22 RX ADMIN — MILRINONE LACTATE 0.12 MCG/KG/MIN: 0.2 INJECTION, SOLUTION INTRAVENOUS at 22:07

## 2024-06-22 RX ADMIN — PANTOPRAZOLE SODIUM 40 MG: 40 TABLET, DELAYED RELEASE ORAL at 07:18

## 2024-06-22 RX ADMIN — SPIRONOLACTONE 25 MG: 25 TABLET ORAL at 09:23

## 2024-06-22 RX ADMIN — SACUBITRIL AND VALSARTAN 1 TABLET: 49; 51 TABLET, FILM COATED ORAL at 09:23

## 2024-06-22 RX ADMIN — ENOXAPARIN SODIUM 40 MG: 100 INJECTION SUBCUTANEOUS at 09:23

## 2024-06-22 ASSESSMENT — PAIN SCALES - GENERAL
PAINLEVEL_OUTOF10: 0

## 2024-06-22 NOTE — PROGRESS NOTES
[facility]  [facility] in [place], [place]  Inpatient Progress Note      Patient name: Yudith Park  Patient : 1975  Patient MRN: [id number]  Consulting MD: Bro Shi MD  Date of service: 24    REASON FOR REFERRAL:  Acute systolic heart failure    INTERVAL HISTORY:  Feeling well. No complaints. Denies dyspnea or fatigue    ASSESSMENT:  Yudith Park is a 48 y.o. female admitted with acute systolic heart failure. Recent coronary CTA showing normal coronary arteries. Non ischemic cardiomyopathy. NYHA class IV symptoms on presentation.     RECOMMENDATIONS:  Medical Therapy for Heart Failure  Continue Milrinone 0.125 mcg/kg/min, will plan to wean once compensated. 6 minute walk prior to discharge  Beta-blocker: Hold until compensated and weaned off inotrope therapy, low output  ACEi/ARB/ARNI: Increase Entresto to  mg BID  Hydralazine/Nitrate: Hold Hydralazine/Nitrate until ARNI and beta blocker are maximized  MRA: Continue Spironolactone 25 mg daily  SGLT2i: Continue Jardiance 10 mg daily  Vitamin D supplementation    Volume Management  Brisk diuresis last 24 hours, weight down 10 lbs. Reduce Lasix to 40 mg IV daily, target weight around 200 lbs  Keep K>4 and Mg>2  Fluid restriction to 2000 cc daily, strict I/Os, daily standing weight    Laboratory and Imaging Studies  Echo reviewed  RHC reviewed  Labs: BMP, NT pro-BNP daily.     Physical activity and education  Ambulate daily  Nutritionist consult  Heart failure education by nurse navigator  Advanced care plan and document POA    Plans at or after hospital discharge  Lifevest on discharge, ordered  Schedule sleep study  Outpatient genetic testing  Outpatient cardiac MRI  Follow-up in [facility] Clinic within 7 days from discharge    HISTORY OF PRESENT ILLNESS:  I had the pleasure of seeing Yudith Park at the request of Dr. Hernandez for evaluation and management of new heart failure.    Yudith Park is a 48 y.o. female with a

## 2024-06-22 NOTE — PROGRESS NOTES
Hospitalist Progress Note  Bro Shi MD  Answering service: [phone]        Date of Service:  2024  NAME:  Yudith Park  :  1975  MRN:  [id number]      Admission Summary:     Patient admitted with new onset CHF.    Interval history / Subjective:     Patient states that her breathing is better today.     Assessment & Plan:     Congestive heart failure  -Acute HFrEF, NYHA class III, echo shows EF of 10 to 15% with severe global hypokinesis and grade 3 diastolic dysfunction  -S/p right heart cath   -On inotropic assisted diuresis with milrinone drip and Lasix  -GDMT's includes Entresto, Aldactone, Jardiance    Atypical chest pain  -CTA negative for PE or acute pathology  -Concern for gallbladder wall thickening on ultrasound with sonographic South sign  -HIDA shows no emptying of gallbladder  -General surgery evaluated the patient and recommended no intervention    Hypertension  -Continue hydralazine and Isordil  -Monitor blood pressure    Diabetes type 2  -Continue insulin sliding scale coverage    Dyslipidemia  -Continue statin    Lung nodule  -Incidental finding of 5 mm right lung nodule on CT of the chest  -Pulmonology recommended outpatient follow-up CT    Nicotine dependence  -On nicotine patch     Code status: Full  Prophylaxis: SCDs  Care Plan discussed with: Patient  Anticipated Disposition: 24 to 48 hours  Central Line:   None    CRITICAL CARE ATTESTATION:  I had a face to face encounter with the patient, reviewed and interpreted patient data including clinical events, labs, images, vital signs, I/O's, and examined patient.  I have discussed the case and the plan and management of the patient's care with the consulting services, the bedside nurses and necessary ancillary providers.       NOTE OF PERSONAL INVOLVEMENT IN CARE   This patient has a high probability of imminent, clinically

## 2024-06-22 NOTE — PLAN OF CARE
Problem: Safety - Adult  Goal: Free from fall injury  6/22/2024 0218 by Pam Delgadillo RN  Outcome: Progressing  6/21/2024 1922 by Kelly Segura RN  Outcome: Progressing     Problem: Discharge Planning  Goal: Discharge to home or other facility with appropriate resources  6/22/2024 0218 by Pam Delgadillo RN  Outcome: Progressing  Flowsheets (Taken 6/21/2024 2020)  Discharge to home or other facility with appropriate resources: Identify barriers to discharge with patient and caregiver  6/21/2024 1922 by Kelly Segura RN  Outcome: Progressing     Problem: Pain  Goal: Verbalizes/displays adequate comfort level or baseline comfort level  6/22/2024 0218 by Pam Delgadillo RN  Outcome: Progressing  Flowsheets (Taken 6/21/2024 2020)  Verbalizes/displays adequate comfort level or baseline comfort level: Encourage patient to monitor pain and request assistance  6/21/2024 1922 by Kelly Segura RN  Outcome: Progressing     Problem: Chronic Conditions and Co-morbidities  Goal: Patient's chronic conditions and co-morbidity symptoms are monitored and maintained or improved  6/22/2024 0218 by Pam Delgadillo RN  Outcome: Progressing  Flowsheets (Taken 6/21/2024 2020)  Care Plan - Patient's Chronic Conditions and Co-Morbidity Symptoms are Monitored and Maintained or Improved: Monitor and assess patient's chronic conditions and comorbid symptoms for stability, deterioration, or improvement  6/21/2024 1922 by Kelly Segura RN  Outcome: Progressing

## 2024-06-23 LAB
ANION GAP SERPL CALC-SCNC: 5 MMOL/L (ref 5–15)
BUN SERPL-MCNC: 20 MG/DL (ref 6–20)
BUN/CREAT SERPL: 23 (ref 12–20)
CALCIUM SERPL-MCNC: 9.3 MG/DL (ref 8.5–10.1)
CHLORIDE SERPL-SCNC: 108 MMOL/L (ref 97–108)
CO2 SERPL-SCNC: 25 MMOL/L (ref 21–32)
CREAT SERPL-MCNC: 0.88 MG/DL (ref 0.55–1.02)
GLUCOSE SERPL-MCNC: 127 MG/DL (ref 65–100)
NT PRO BNP: 231 PG/ML
POTASSIUM SERPL-SCNC: 3.4 MMOL/L (ref 3.5–5.1)
SODIUM SERPL-SCNC: 138 MMOL/L (ref 136–145)

## 2024-06-23 PROCEDURE — 6370000000 HC RX 637 (ALT 250 FOR IP): Performed by: INTERNAL MEDICINE

## 2024-06-23 PROCEDURE — 6360000002 HC RX W HCPCS: Performed by: INTERNAL MEDICINE

## 2024-06-23 PROCEDURE — 80048 BASIC METABOLIC PNL TOTAL CA: CPT

## 2024-06-23 PROCEDURE — 83880 ASSAY OF NATRIURETIC PEPTIDE: CPT

## 2024-06-23 PROCEDURE — 99233 SBSQ HOSP IP/OBS HIGH 50: CPT | Performed by: INTERNAL MEDICINE

## 2024-06-23 PROCEDURE — 2060000000 HC ICU INTERMEDIATE R&B

## 2024-06-23 PROCEDURE — 36415 COLL VENOUS BLD VENIPUNCTURE: CPT

## 2024-06-23 PROCEDURE — 2580000003 HC RX 258: Performed by: INTERNAL MEDICINE

## 2024-06-23 RX ORDER — MILRINONE LACTATE 0.2 MG/ML
0.06 INJECTION, SOLUTION INTRAVENOUS CONTINUOUS
Status: DISCONTINUED | OUTPATIENT
Start: 2024-06-23 | End: 2024-06-24

## 2024-06-23 RX ORDER — FUROSEMIDE 40 MG/1
40 TABLET ORAL DAILY
Status: DISCONTINUED | OUTPATIENT
Start: 2024-06-23 | End: 2024-06-25 | Stop reason: HOSPADM

## 2024-06-23 RX ADMIN — SACUBITRIL AND VALSARTAN 1 TABLET: 49; 51 TABLET, FILM COATED ORAL at 08:46

## 2024-06-23 RX ADMIN — PANTOPRAZOLE SODIUM 40 MG: 40 TABLET, DELAYED RELEASE ORAL at 07:21

## 2024-06-23 RX ADMIN — ENOXAPARIN SODIUM 40 MG: 100 INJECTION SUBCUTANEOUS at 08:47

## 2024-06-23 RX ADMIN — SODIUM CHLORIDE, PRESERVATIVE FREE 10 ML: 5 INJECTION INTRAVENOUS at 21:27

## 2024-06-23 RX ADMIN — SACUBITRIL AND VALSARTAN 1 TABLET: 49; 51 TABLET, FILM COATED ORAL at 21:27

## 2024-06-23 RX ADMIN — FUROSEMIDE 40 MG: 40 TABLET ORAL at 08:47

## 2024-06-23 RX ADMIN — EMPAGLIFLOZIN 10 MG: 10 TABLET, FILM COATED ORAL at 08:47

## 2024-06-23 RX ADMIN — Medication 2000 UNITS: at 08:47

## 2024-06-23 RX ADMIN — SPIRONOLACTONE 25 MG: 25 TABLET ORAL at 08:47

## 2024-06-23 ASSESSMENT — PAIN SCALES - GENERAL
PAINLEVEL_OUTOF10: 0

## 2024-06-23 NOTE — PLAN OF CARE
Problem: Safety - Adult  Goal: Free from fall injury  Outcome: Progressing     Problem: Discharge Planning  Goal: Discharge to home or other facility with appropriate resources  Outcome: Progressing  Flowsheets (Taken 6/22/2024 2042)  Discharge to home or other facility with appropriate resources: Identify barriers to discharge with patient and caregiver     Problem: Pain  Goal: Verbalizes/displays adequate comfort level or baseline comfort level  Outcome: Progressing  Flowsheets (Taken 6/22/2024 2042)  Verbalizes/displays adequate comfort level or baseline comfort level: Encourage patient to monitor pain and request assistance     Problem: Chronic Conditions and Co-morbidities  Goal: Patient's chronic conditions and co-morbidity symptoms are monitored and maintained or improved  Outcome: Progressing  Flowsheets (Taken 6/22/2024 2042)  Care Plan - Patient's Chronic Conditions and Co-Morbidity Symptoms are Monitored and Maintained or Improved: Monitor and assess patient's chronic conditions and comorbid symptoms for stability, deterioration, or improvement

## 2024-06-23 NOTE — PROGRESS NOTES
[facility]  [facility] in [place], [place]  Inpatient Progress Note      Patient name: Yudith Park  Patient : 1975  Patient MRN: [id number]  Consulting MD: Bro Shi MD  Date of service: 24    REASON FOR REFERRAL:  Acute systolic heart failure    INTERVAL HISTORY:  Continues to do well. Ambulated in the halls without difficulty    ASSESSMENT:  Yudith Park is a 48 y.o. female admitted with acute systolic heart failure. Recent coronary CTA showing normal coronary arteries. Non ischemic cardiomyopathy. NYHA class IV symptoms on presentation.     RECOMMENDATIONS:  Medical Therapy for Heart Failure  Reduce Milrinone to 0.025 mcg/kg/min today and plan to turn off tomorrow  Check NICOM tomorrow off inotrope and 6 minute walk once off inotrope  Beta-blocker: Hold until compensated and weaned off inotrope therapy, low output  ACEi/ARB/ARNI: Continue Entresto 49-51 mg BID  Hydralazine/Nitrate: Hold Hydralazine/Nitrate until ARNI and beta blocker are maximized  MRA: Continue Spironolactone 25 mg daily  SGLT2i: Continue Jardiance 10 mg daily  Vitamin D supplementation    Volume Management  Transition to PO Lasix 40 mg daily  Target weight 200-210 lbs. Weight down 15 lbs this admission  Keep K>4 and Mg>2  Fluid restriction to 2000 cc daily, strict I/Os, daily standing weight    Laboratory and Imaging Studies  Echo reviewed  RHC reviewed  Labs: BMP, NT pro-BNP daily    Physical activity and education  Ambulate daily  Nutritionist consult  Heart failure education by nurse navigator  Advanced care plan and document POA    Plans at or after hospital discharge  Lifevest on discharge, ordered  Schedule sleep study  Outpatient genetic testing  Outpatient cardiac MRI  Follow-up in [facility] Clinic within 7 days from discharge    HISTORY OF PRESENT ILLNESS:  I had the pleasure of seeing Yudith Park at the request of Dr. Hernandez for evaluation and management of new heart failure.    Yudith Park

## 2024-06-23 NOTE — PROGRESS NOTES
Hospitalist Progress Note  Bro Shi MD  Answering service: [phone]        Date of Service:  2024  NAME:  Yudith Park  :  1975  MRN:  [id number]      Admission Summary:     Patient admitted with new onset CHF.    Interval history / Subjective:     Patient states that her breathing is better today.     Assessment & Plan:     Congestive heart failure  -Acute HFrEF, NYHA class III, echo shows EF of 10 to 15% with severe global hypokinesis and grade 3 diastolic dysfunction  -S/p right heart cath   -On inotropic assisted diuresis with milrinone drip and Lasix, weaning milrinone drip  -GDMT's includes Entresto, Aldactone, Jardiance  -Advanced heart failure team following    Atypical chest pain  -CTA negative for PE or acute pathology  -Concern for gallbladder wall thickening on ultrasound with sonographic South sign  -HIDA shows no emptying of gallbladder  -General surgery evaluated the patient and recommended no intervention    Hypertension  -Continue hydralazine and Isordil  -Monitor blood pressure    Diabetes type 2  -Continue insulin sliding scale coverage    Dyslipidemia  -Continue statin    Lung nodule  -Incidental finding of 5 mm right lung nodule on CT of the chest  -Pulmonology recommended outpatient follow-up CT    Nicotine dependence  -On nicotine patch     Code status: Full  Prophylaxis: SCDs  Care Plan discussed with: Patient  Anticipated Disposition: 24 to 48 hours  Central Line:   None    CRITICAL CARE ATTESTATION:  I had a face to face encounter with the patient, reviewed and interpreted patient data including clinical events, labs, images, vital signs, I/O's, and examined patient.  I have discussed the case and the plan and management of the patient's care with the consulting services, the bedside nurses and necessary ancillary providers.       NOTE OF PERSONAL INVOLVEMENT IN CARE   This

## 2024-06-24 DIAGNOSIS — I50.22 CHRONIC SYSTOLIC HEART FAILURE (HCC): Primary | ICD-10-CM

## 2024-06-24 LAB
ANION GAP SERPL CALC-SCNC: 5 MMOL/L (ref 5–15)
BUN SERPL-MCNC: 14 MG/DL (ref 6–20)
BUN/CREAT SERPL: 18 (ref 12–20)
CALCIUM SERPL-MCNC: 9.1 MG/DL (ref 8.5–10.1)
CHLORIDE SERPL-SCNC: 111 MMOL/L (ref 97–108)
CO2 SERPL-SCNC: 22 MMOL/L (ref 21–32)
CREAT SERPL-MCNC: 0.78 MG/DL (ref 0.55–1.02)
GLUCOSE SERPL-MCNC: 113 MG/DL (ref 65–100)
NT PRO BNP: 159 PG/ML
POTASSIUM SERPL-SCNC: 3.8 MMOL/L (ref 3.5–5.1)
SODIUM SERPL-SCNC: 138 MMOL/L (ref 136–145)

## 2024-06-24 PROCEDURE — 94618 PULMONARY STRESS TESTING: CPT

## 2024-06-24 PROCEDURE — 2060000000 HC ICU INTERMEDIATE R&B

## 2024-06-24 PROCEDURE — 36415 COLL VENOUS BLD VENIPUNCTURE: CPT

## 2024-06-24 PROCEDURE — 83880 ASSAY OF NATRIURETIC PEPTIDE: CPT

## 2024-06-24 PROCEDURE — 80048 BASIC METABOLIC PNL TOTAL CA: CPT

## 2024-06-24 PROCEDURE — 2580000003 HC RX 258: Performed by: INTERNAL MEDICINE

## 2024-06-24 PROCEDURE — 99232 SBSQ HOSP IP/OBS MODERATE 35: CPT | Performed by: INTERNAL MEDICINE

## 2024-06-24 PROCEDURE — 6370000000 HC RX 637 (ALT 250 FOR IP): Performed by: INTERNAL MEDICINE

## 2024-06-24 RX ADMIN — FUROSEMIDE 40 MG: 40 TABLET ORAL at 09:47

## 2024-06-24 RX ADMIN — SODIUM CHLORIDE, PRESERVATIVE FREE 10 ML: 5 INJECTION INTRAVENOUS at 09:47

## 2024-06-24 RX ADMIN — Medication 2000 UNITS: at 09:47

## 2024-06-24 RX ADMIN — SPIRONOLACTONE 25 MG: 25 TABLET ORAL at 09:47

## 2024-06-24 RX ADMIN — SODIUM CHLORIDE, PRESERVATIVE FREE 10 ML: 5 INJECTION INTRAVENOUS at 23:00

## 2024-06-24 RX ADMIN — SACUBITRIL AND VALSARTAN 1 TABLET: 49; 51 TABLET, FILM COATED ORAL at 23:06

## 2024-06-24 RX ADMIN — EMPAGLIFLOZIN 10 MG: 10 TABLET, FILM COATED ORAL at 09:47

## 2024-06-24 RX ADMIN — SACUBITRIL AND VALSARTAN 1 TABLET: 49; 51 TABLET, FILM COATED ORAL at 09:47

## 2024-06-24 RX ADMIN — PANTOPRAZOLE SODIUM 40 MG: 40 TABLET, DELAYED RELEASE ORAL at 07:06

## 2024-06-24 NOTE — PROGRESS NOTES
Hospitalist Progress Note  Bro Shi MD  Answering service: [phone]        Date of Service:  2024  NAME:  Yudith Park  :  1975  MRN:  [id number]      Admission Summary:     Patient admitted with new onset CHF.    Interval history / Subjective:     Patient states that her breathing is better today.     Assessment & Plan:     Congestive heart failure  -Acute HFrEF, NYHA class III, echo shows EF of 10 to 15% with severe global hypokinesis and grade 3 diastolic dysfunction  -S/p right heart cath   -On diuresis with Lasix, off of milrinone drip this a.m.  -GDMT's includes Entresto, Aldactone, Jardiance  -Advanced heart failure team following, plan for NICOM and 6-minute walk    Atypical chest pain  -CTA negative for PE or acute pathology  -Concern for gallbladder wall thickening on ultrasound with sonographic South sign  -HIDA shows no emptying of gallbladder  -General surgery evaluated the patient and recommended no intervention    Hypertension  -Continue hydralazine and Isordil  -Monitor blood pressure    Diabetes type 2  -Continue insulin sliding scale coverage    Dyslipidemia  -Continue statin    Lung nodule  -Incidental finding of 5 mm right lung nodule on CT of the chest  -Pulmonology recommended outpatient follow-up CT    Nicotine dependence  -On nicotine patch     Code status: Full  Prophylaxis: SCDs  Care Plan discussed with: Patient  Anticipated Disposition: 24 to 48 hours  Central Line:   None             Review of Systems:   Pertinent items are noted in HPI.         Vital Signs:    Last 24hrs VS reviewed since prior progress note. Most recent are:  Vitals:    24 1000   BP:    Pulse: 81   Resp:    Temp:    SpO2:          Intake/Output Summary (Last 24 hours) at 2024 1120  Last data filed at 2024 0944  Gross per 24 hour   Intake 844.73 ml   Output 1650 ml   Net -805.27 ml

## 2024-06-24 NOTE — PLAN OF CARE
Problem: Safety - Adult  Goal: Free from fall injury  Outcome: Progressing     Problem: Discharge Planning  Goal: Discharge to home or other facility with appropriate resources  Outcome: Progressing     Problem: Pain  Goal: Verbalizes/displays adequate comfort level or baseline comfort level  Outcome: Progressing     Problem: Chronic Conditions and Co-morbidities  Goal: Patient's chronic conditions and co-morbidity symptoms are monitored and maintained or improved  Outcome: Progressing

## 2024-06-24 NOTE — PROGRESS NOTES
Yudith Park underwent a 6 min walk test. Her initial O2 saturation was  96% and her baseline heart rate was  73 BPM. She walked from the room to CCU at a distance of  1500 ft for 6 minutes. Her post O2  saturation was  94% and her post walk heart rate was  93 BPM.

## 2024-06-24 NOTE — PROGRESS NOTES
[facility]URE [facility]  [facility] in [place], [place]  Inpatient Progress Note      Patient name: Yudith Park  Patient : 1975  Patient MRN: [id number]  Consulting MD: Bro Shi MD  Date of service: 24    REASON FOR REFERRAL:  Acute systolic heart failure    INTERVAL HISTORY:  Continues to do well. Ambulated in the halls without difficulty.      ASSESSMENT:  Yudith Park is a 48 y.o. female admitted with acute systolic heart failure. Recent coronary CTA showing normal coronary arteries. Non ischemic cardiomyopathy. NYHA class IV symptoms on presentation, now class II-III.     RECOMMENDATIONS:  Medical Therapy for Heart Failure  Discontinue milrinone today  Check NICOM and 6 minute walk once off inotrope  Beta-blocker: Hold until compensated and weaned off inotrope therapy, will consider starting low dose BB tomorrow if pt remains stable off inotrope  ACEi/ARB/ARNI: Continue Entresto 49-51 mg BID  Hydralazine/Nitrate: Hold Hydralazine/Nitrate until ARNI and beta blocker are maximized  MRA: Continue Spironolactone 25 mg daily  SGLT2i: Continue Jardiance 10 mg daily  Vitamin D supplementation    Volume Management  Continue  Lasix 40 mg daily  Target weight 200-210 lbs. Weight down 17 lbs this admission  Keep K>4 and Mg>2  Fluid restriction to 2000 cc daily, strict I/Os, daily standing weight    Laboratory and Imaging Studies  Echo reviewed  RHC reviewed  Labs: BMP, NT pro-BNP daily    Physical activity and education  Ambulate daily  Nutritionist consult  Heart failure education by nurse navigator  Advanced care plan and document POA    Plans at or after hospital discharge  Lifevest on discharge, ordered and at bedside.  Pt states she will wear once discharged  Schedule sleep study  Outpatient genetic testing  Outpatient cardiac MRI  Follow-up in [facility] Clinic within 7 days from discharge    HISTORY OF PRESENT ILLNESS:  I had the pleasure of seeing Yudith Park at the request of

## 2024-06-24 NOTE — PROGRESS NOTES
NICOM Hemodynamic Monitoring     /68   Pulse 93   Temp 98 °F (36.7 °C) (Oral)   Resp 18   Ht 1.676 m (5' 6\")   Wt 95.2 kg (209 lb 14.1 oz)   SpO2 98%   Breastfeeding No   BMI 33.88 kg/m²       Baseline assessment:        CO 4.7        CI 2.3        SVI 36        SVV 72.9        TPR 1127

## 2024-06-25 VITALS
SYSTOLIC BLOOD PRESSURE: 103 MMHG | WEIGHT: 212.96 LBS | OXYGEN SATURATION: 97 % | HEIGHT: 66 IN | BODY MASS INDEX: 34.23 KG/M2 | RESPIRATION RATE: 18 BRPM | TEMPERATURE: 98.3 F | DIASTOLIC BLOOD PRESSURE: 68 MMHG | HEART RATE: 63 BPM

## 2024-06-25 LAB
ANION GAP SERPL CALC-SCNC: 5 MMOL/L (ref 5–15)
BUN SERPL-MCNC: 17 MG/DL (ref 6–20)
BUN/CREAT SERPL: 20 (ref 12–20)
CALCIUM SERPL-MCNC: 9.1 MG/DL (ref 8.5–10.1)
CHLORIDE SERPL-SCNC: 110 MMOL/L (ref 97–108)
CO2 SERPL-SCNC: 24 MMOL/L (ref 21–32)
CREAT SERPL-MCNC: 0.87 MG/DL (ref 0.55–1.02)
GLUCOSE SERPL-MCNC: 120 MG/DL (ref 65–100)
NT PRO BNP: 157 PG/ML
POTASSIUM SERPL-SCNC: 3.6 MMOL/L (ref 3.5–5.1)
SODIUM SERPL-SCNC: 139 MMOL/L (ref 136–145)

## 2024-06-25 PROCEDURE — 99232 SBSQ HOSP IP/OBS MODERATE 35: CPT | Performed by: INTERNAL MEDICINE

## 2024-06-25 PROCEDURE — 6370000000 HC RX 637 (ALT 250 FOR IP): Performed by: INTERNAL MEDICINE

## 2024-06-25 PROCEDURE — 83880 ASSAY OF NATRIURETIC PEPTIDE: CPT

## 2024-06-25 PROCEDURE — 80048 BASIC METABOLIC PNL TOTAL CA: CPT

## 2024-06-25 PROCEDURE — 36415 COLL VENOUS BLD VENIPUNCTURE: CPT

## 2024-06-25 RX ORDER — NICOTINE 21 MG/24HR
1 PATCH, TRANSDERMAL 24 HOURS TRANSDERMAL DAILY
Qty: 30 PATCH | Refills: 0 | Status: SHIPPED | OUTPATIENT
Start: 2024-06-26

## 2024-06-25 RX ORDER — FUROSEMIDE 40 MG/1
40 TABLET ORAL DAILY
Qty: 30 TABLET | Refills: 0 | Status: SHIPPED | OUTPATIENT
Start: 2024-06-26

## 2024-06-25 RX ORDER — SPIRONOLACTONE 25 MG/1
25 TABLET ORAL DAILY
Qty: 30 TABLET | Refills: 0 | Status: SHIPPED | OUTPATIENT
Start: 2024-06-26

## 2024-06-25 RX ADMIN — SPIRONOLACTONE 25 MG: 25 TABLET ORAL at 08:55

## 2024-06-25 RX ADMIN — Medication 2000 UNITS: at 08:55

## 2024-06-25 RX ADMIN — FUROSEMIDE 40 MG: 40 TABLET ORAL at 08:55

## 2024-06-25 RX ADMIN — PANTOPRAZOLE SODIUM 40 MG: 40 TABLET, DELAYED RELEASE ORAL at 07:14

## 2024-06-25 RX ADMIN — SACUBITRIL AND VALSARTAN 1 TABLET: 49; 51 TABLET, FILM COATED ORAL at 08:55

## 2024-06-25 RX ADMIN — EMPAGLIFLOZIN 10 MG: 10 TABLET, FILM COATED ORAL at 08:55

## 2024-06-25 ASSESSMENT — PAIN SCALES - GENERAL
PAINLEVEL_OUTOF10: 0

## 2024-06-25 NOTE — PROGRESS NOTES
1500: Discussed with the patient and all questioned fully answered. She will call me if any problems arise.      Medication List        START taking these medications      furosemide 40 MG tablet  Commonly known as: LASIX  Take 1 tablet by mouth daily  Start taking on: June 26, 2024     nicotine 21 MG/24HR  Commonly known as: NICODERM CQ  Place 1 patch onto the skin daily  Start taking on: June 26, 2024     sacubitril-valsartan 49-51 MG per tablet  Commonly known as: ENTRESTO  Take 1 tablet by mouth 2 times daily     spironolactone 25 MG tablet  Commonly known as: ALDACTONE  Take 1 tablet by mouth daily  Start taking on: June 26, 2024            CHANGE how you take these medications      empagliflozin 10 MG tablet  Commonly known as: JARDIANCE  Take 1 tablet by mouth daily  Start taking on: June 26, 2024  What changed:   medication strength  See the new instructions.            CONTINUE taking these medications      metFORMIN 500 MG tablet  Commonly known as: GLUCOPHAGE     omeprazole 40 MG delayed release capsule  Commonly known as: PRILOSEC     Rosuvastatin Calcium 10 MG Cpsp            STOP taking these medications      hydroCHLOROthiazide 25 MG tablet  Commonly known as: HYDRODIURIL               Where to Get Your Medications        These medications were sent to [facility] #[id number] - [place] - [address] - P [phone] - F [phone] [address] [place] [place] [address]      Phone: [phone]   empagliflozin 10 MG tablet  furosemide 40 MG tablet  nicotine 21 MG/24HR  sacubitril-valsartan 49-51 MG per tablet  spironolactone 25 MG tablet

## 2024-06-25 NOTE — PLAN OF CARE
Problem: Safety - Adult  Goal: Free from fall injury  6/25/2024 0241 by Nathan Mac RN  Outcome: Progressing  6/24/2024 1556 by Jennifer Wallace RN  Outcome: Progressing     Problem: Discharge Planning  Goal: Discharge to home or other facility with appropriate resources  6/25/2024 0241 by Nathan Mac RN  Outcome: Progressing  6/24/2024 1556 by Jennifer Wallace RN  Outcome: Progressing  Flowsheets (Taken 6/24/2024 0944 by Nikki Grossman)  Discharge to home or other facility with appropriate resources: Identify barriers to discharge with patient and caregiver     Problem: Pain  Goal: Verbalizes/displays adequate comfort level or baseline comfort level  6/25/2024 0241 by Nathan Mac RN  Outcome: Progressing  6/24/2024 1556 by Jennifer Wallace RN  Outcome: Progressing     Problem: Chronic Conditions and Co-morbidities  Goal: Patient's chronic conditions and co-morbidity symptoms are monitored and maintained or improved  6/25/2024 0241 by Nathan Mac RN  Outcome: Progressing  6/24/2024 1556 by Jennifer Wallace RN  Outcome: Progressing  Flowsheets (Taken 6/24/2024 0944 by Nikki Grossman)  Care Plan - Patient's Chronic Conditions and Co-Morbidity Symptoms are Monitored and Maintained or Improved: Monitor and assess patient's chronic conditions and comorbid symptoms for stability, deterioration, or improvement

## 2024-06-25 NOTE — PROGRESS NOTES
Hospital follow-up NEW PCP transitional care appointment has been scheduled with Dr. Sanaz Mathew for Wednesday, August 7th, 2024 at 10:40 a.m. Please plan to arrive 20 min early with Insurance card, ID Pending patient discharge summary, list of medications.  Mitzy Villarreal, Care Management Assistant

## 2024-06-25 NOTE — CARE COORDINATION
JENNIFER    Patient is ready for d/c this date.  CM met w patient at bedside to discuss the d/c plan w returning home and patient acknowledged understanding and reports her uncle is on the way from the [facility] and should be here in the next 30 mins.  CMA is still working on finding a new PCP in her area and she denies concerns w d/c home this date.  All questions have been answered and CM wished patient well.    Keena Steen MSW CCM  Care Management

## 2024-06-25 NOTE — PROGRESS NOTES
ADVANCED HEART FAILURE CENTER  [facility] in [place], [place]  Inpatient Progress Note      Patient name: Yudith Park  Patient : 1975  Patient MRN: [id number]  Consulting MD: Bro Shi MD  Date of service: 24    REASON FOR REFERRAL:  Acute systolic heart failure    INTERVAL HISTORY:  Feeling well. Vitals stable. 457 m on 6 minute walk test off inotrope.   Labs stable    ASSESSMENT:  Yudith Park is a 48 y.o. female admitted with acute systolic heart failure. Recent coronary CTA showing normal coronary arteries. Non ischemic cardiomyopathy. NYHA class IV symptoms on presentation, now class II.     RECOMMENDATIONS:  Medical Therapy for Heart Failure  Stable off Milrinone. Good 6 minute walk distance off inotrope. NICOM CI 2.3 off inotrope  Beta-blocker: Holding for bradycardia, HR 50s. Will re evaluate outpatient ability to start  ACEi/ARB/ARNI: Continue Entresto 49-51 mg BID  Hydralazine/Nitrate: Hold Hydralazine/Nitrate until ARNI and beta blocker are maximized  MRA: Continue Spironolactone 25 mg daily  SGLT2i: Continue Jardiance 10 mg daily  Vitamin D supplementation    Volume Management  Continue Lasix 40 mg daily  Keep K>4 and Mg>2  Fluid restriction to 2000 cc daily, strict I/Os, daily standing weight    Laboratory and Imaging Studies  Echo reviewed  RHC reviewed  Labs: BMP, NT pro-BNP daily    Physical activity and education  Ambulate daily  Nutritionist consult  Heart failure education by nurse navigator  Advanced care plan and document POA    Plans at or after hospital discharge  Lifevest on discharge, ordered and at bedside.  Pt states she will wear once discharged  Schedule sleep study  Outpatient genetic testing  Outpatient cardiac MRI  Follow-up in AHF Clinic on     Ok to discharge home today    HISTORY OF PRESENT ILLNESS:  I had the pleasure of seeing Yudith Park at the request of Dr. Hernandez for evaluation and management of new heart failure.    Yudith Park is a 48  y.o. female with a history of HTN, HLD, and Diabetes. She is a smoker. She is admitted with acute systolic heart failure.    Patient reports a year ago she was seen in urgent care for bronchitis. She was noted on CXR to have cardiomegaly and was referred to cardiology. She reports cardiology evaluation, though it does not appear an echocardiogram was performed. She had a coronary CTA 3/29/2024 which by report showed normal coronary arteries. She reports over the last year having intermittent dyspnea on exertion with walking moderate distances or up stairs. Over the last 2 weeks she reports orthopnea and PND. She was seen in urgent care and reported chest and RUQ abdominal pain and was sent to the ER to evaluate for gallbladder disease. Labs on presentation showed proBNP of 1433, negative troponin. CTA showed signs of CHF and a right lung nodule. No PE. RUQ ultrasound showed no cholelithiasis but sonographic padilla sign. HIDA scan showed no emptying of the gallbladder. She was admitted for heart failure.    Echo on admission showed reduced EF of 10-15% with severely reduced RV function. Patient denies any significant alcohol intake. She is a smoker but plans to now quit. No drug use. No family history of heart failure on her mother's side. She does not know her father.       PROBLEM LIST:  Acute systolic heart failure  Stage C  Non ischemic cardiomyopathy  Normal coronary arteries on Cardiac CTA 3/29/24  HTN  DM  HLD  Lung Nodule  Dyskinesia of Gallbladder    LIFE GOALS:  Lifestyle goals reviewed with the patient.    IMAGING STUDIES REVIEWED:  Echo 6/19/24    Left Ventricle: Severely reduced left ventricular systolic function with a visually estimated EF of 10 -15%. Left ventricle is severely dilated. Mildly increased wall thickness. Severe global hypokinesis present. Grade III diastolic dysfunction with increased LAP.    Right Ventricle: Right ventricle is mildly dilated. Moderately to severely reduced systolic

## 2024-06-25 NOTE — NURSE NAVIGATOR
Heart Failure Nurse Navigator rounds completed.    HF NN provided introduction to self and nurse navigator role. [facility] Discharge Packet for Patients Diagnosed with Heart Failure booklet given to patient, along with weight calendar, signs/symptoms magnet, and card with QR codes for HF video resources.  Educated using teach back method.  Discussed diagnosis definition and assessed patient understanding.  Reviewed importance of daily weight monitoring and low sodium diet (1500-2000mg daily).  HF NN went over the importance of daily weights to be able to keep track of fluid building up.  Advised patient that if she sees a gain of 3 lbs over night or 5 lbs in one week she should call her cardiologist to make aware and get instructions.      Discussed with patient that she will likely go home with several new medications.  Talked about the importance of taking all medications as prescribed and only stop taking if the doctor advises.    Advised patient that follow up appointment will be scheduled and placed on Discharge Instructions prior to discharge. Patient given [facility] flyer and is agreeable to services as needed.  Patient provided with contact information for HF NN and encouraged to call with questions. Will continue to follow until discharge.        Time spent with patient discussing HF education:  20 min

## 2024-06-26 LAB
ANA TITR SER IF: NEGATIVE
LABORATORY COMMENT REPORT: NORMAL

## 2024-06-28 ENCOUNTER — OFFICE VISIT (OUTPATIENT)
Age: 49
End: 2024-06-28
Payer: COMMERCIAL

## 2024-06-28 VITALS
HEART RATE: 86 BPM | DIASTOLIC BLOOD PRESSURE: 86 MMHG | SYSTOLIC BLOOD PRESSURE: 110 MMHG | TEMPERATURE: 97.8 F | HEIGHT: 66 IN | WEIGHT: 211 LBS | OXYGEN SATURATION: 98 % | BODY MASS INDEX: 33.91 KG/M2 | RESPIRATION RATE: 16 BRPM

## 2024-06-28 DIAGNOSIS — I50.21 ACUTE SYSTOLIC CONGESTIVE HEART FAILURE (HCC): Primary | ICD-10-CM

## 2024-06-28 PROCEDURE — 99215 OFFICE O/P EST HI 40 MIN: CPT | Performed by: NURSE PRACTITIONER

## 2024-06-28 RX ORDER — METOPROLOL SUCCINATE 25 MG/1
12.5 TABLET, EXTENDED RELEASE ORAL DAILY
Qty: 15 TABLET | Refills: 1 | Status: SHIPPED | OUTPATIENT
Start: 2024-06-28

## 2024-06-28 RX ORDER — MELATONIN
2000 DAILY
Qty: 30 TABLET | Refills: 0 | Status: SHIPPED | OUTPATIENT
Start: 2024-06-28

## 2024-06-28 ASSESSMENT — PATIENT HEALTH QUESTIONNAIRE - PHQ9
SUM OF ALL RESPONSES TO PHQ9 QUESTIONS 1 & 2: 0
SUM OF ALL RESPONSES TO PHQ QUESTIONS 1-9: 0
SUM OF ALL RESPONSES TO PHQ QUESTIONS 1-9: 0
2. FEELING DOWN, DEPRESSED OR HOPELESS: NOT AT ALL
SUM OF ALL RESPONSES TO PHQ QUESTIONS 1-9: 0
1. LITTLE INTEREST OR PLEASURE IN DOING THINGS: NOT AT ALL
SUM OF ALL RESPONSES TO PHQ QUESTIONS 1-9: 0

## 2024-06-28 ASSESSMENT — ENCOUNTER SYMPTOMS
COUGH: 0
NAUSEA: 0
VOMITING: 0
ABDOMINAL DISTENTION: 0
SHORTNESS OF BREATH: 0

## 2024-06-28 NOTE — PROGRESS NOTES
ADVANCED HEART FAILURE CENTER  [facility] in [place], [place]  Outpatient Clinic Note      Patient name: Yudith Park  Patient : 1975  Patient MRN: [id number]  Date of service: 24      Chief Complaint   Patient presents with    Congestive Heart Failure        ASSESSMENT:  Yudtih Park is a 48 y.o. female admitted with acute systolic heart failure. Recent coronary CTA showing normal coronary arteries. Non ischemic cardiomyopathy. NYHA class IV symptoms on presentation, now class II.   Stable off Milrinone. Good 6 minute walk distance off inotrope. NICOM CI 2.3 off inotrope    Interval Events:  24 Yudith Park presents for hospital discharge. She states she feels well today, denies acute HF symptoms since discharge on 24. She is wearing her lifevest and is taking her GDMT. She notes she lives about 3 hours away and is hopeful to find cardiac rehab, sleep medicine and a PCP closer to home.   VSS- HR 70-83 in clinic   Inpatient labs reviewed       RECOMMENDATIONS:  Medical Therapy for Heart Failure  Beta-blocker: Start Toprol 12.5mg at night- will monitor HR closely   ACEi/ARB/ARNI: Continue Entresto 49-51 mg BID  Hydralazine/Nitrate: Hold Hydralazine/Nitrate until ARNI and beta blocker are maximized  MRA: Continue Spironolactone 25 mg daily  SGLT2i: Continue Jardiance 10 mg daily  Daily Vit D 2000 units daily     Volume Management  Continue Lasix 40 mg daily  Keep K>4 and Mg>2  Fluid restriction to 2000 cc daily, trend home standing weight    Laboratory and Imaging Studies  Echo reviewed  RHC reviewed  Labs: HF labs at next visit     Physical activity and education  Ambulate daily  Nutritionist consult  Heart failure education by nurse navigator  Advanced care plan and document POA    Plans at or after hospital discharge  Wearing lifevest   Schedule sleep study- referral ordered   Outpatient genetic testing- will discuss at next appt   Outpatient cardiac MRI- ordered

## 2024-07-01 NOTE — PROGRESS NOTES
Physician Progress Note      PATIENT:               CURTIS RECINOS  CSN #:                  [id number]  :                       1975  ADMIT DATE:       2024 7:49 AM  DISCH DATE:        2024 3:31 PM  RESPONDING  PROVIDER #:        Bro Irwin MD          QUERY TEXT:    Pt admitted with Acute congestive heart failure. In Cardiology Consultant,   states Acute on chronic systolic/diastolic heart failure.  EF 10 to 15% with   evidence of nonischemic cardiomyopathy on . Pt noted to also have HTN.   ICD-10 coding directive assumes a causal relationship between hypertension and   CHF.  After further review, can the etiology of the CHF be clarified as:    The medical record reflects the following:  Risk Factors: 48 y.o. female with HTN, Cardiomegaly, TR, Non ischemic   cardiomyopathy, HLD, DM  Clinical Indicators:   AHF Consultant  \"48 y.o. female admitted with acute systolic heart failure. Recent coronary   CTA showing normal coronary arteries. Non ischemic cardiomyopathy. NYHA class   IV symptoms on presentation, now class II\"    PROBLEM LIST:  Acute systolic heart failure  Stage C  Non ischemic cardiomyopathy  Normal coronary arteries on Cardiac CTA 3/29/24  HTN    IMAGING STUDIES REVIEWED:  Echo 24  -  Left Ventricle: Severely reduced left ventricular systolic function with a   visually estimated EF of 10 -15%. Left ventricle is severely dilated. Mildly   increased wall thickness. Severe global hypokinesis present. Grade III   diastolic dysfunction with increased LAP.  -  Right Ventricle: Right ventricle is mildly dilated. Moderately to severely   reduced systolic function.  -  Aortic Valve: Trileaflet valve.  -  Mitral Valve: Mild to moderate regurgitation.  -  Tricuspid Valve: Mild annular dilation. Moderately elevated RVSP,   consistent with moderate pulmonary hypertension. The estimated RVSP is 58   mmHg.  -  Left Atrium: Left atrium is severely dilated.  -  Right Atrium: Right

## 2024-07-05 ENCOUNTER — TELEPHONE (OUTPATIENT)
Age: 49
End: 2024-07-05

## 2024-07-11 ENCOUNTER — OFFICE VISIT (OUTPATIENT)
Age: 49
End: 2024-07-11
Payer: COMMERCIAL

## 2024-07-11 VITALS
HEART RATE: 67 BPM | SYSTOLIC BLOOD PRESSURE: 92 MMHG | TEMPERATURE: 97.6 F | RESPIRATION RATE: 18 BRPM | HEIGHT: 65 IN | BODY MASS INDEX: 35.16 KG/M2 | WEIGHT: 211 LBS | DIASTOLIC BLOOD PRESSURE: 70 MMHG | OXYGEN SATURATION: 98 %

## 2024-07-11 DIAGNOSIS — I50.21 ACUTE SYSTOLIC CONGESTIVE HEART FAILURE (HCC): ICD-10-CM

## 2024-07-11 DIAGNOSIS — I50.21 ACUTE SYSTOLIC CONGESTIVE HEART FAILURE (HCC): Primary | ICD-10-CM

## 2024-07-11 LAB
COMMENT:: NORMAL
SPECIMEN HOLD: NORMAL

## 2024-07-11 PROCEDURE — 99214 OFFICE O/P EST MOD 30 MIN: CPT | Performed by: NURSE PRACTITIONER

## 2024-07-11 RX ORDER — SPIRONOLACTONE 25 MG/1
25 TABLET ORAL DAILY
Qty: 90 TABLET | Refills: 0 | Status: SHIPPED | OUTPATIENT
Start: 2024-07-11

## 2024-07-11 RX ORDER — FUROSEMIDE 40 MG/1
40 TABLET ORAL DAILY
Qty: 60 TABLET | Refills: 2 | Status: SHIPPED | OUTPATIENT
Start: 2024-07-11

## 2024-07-11 RX ORDER — MELATONIN
2000 DAILY
Qty: 60 TABLET | Refills: 1 | Status: SHIPPED | OUTPATIENT
Start: 2024-07-11

## 2024-07-11 RX ORDER — METOPROLOL SUCCINATE 25 MG/1
12.5 TABLET, EXTENDED RELEASE ORAL DAILY
Qty: 30 TABLET | Refills: 0 | Status: SHIPPED | OUTPATIENT
Start: 2024-07-11

## 2024-07-11 ASSESSMENT — PATIENT HEALTH QUESTIONNAIRE - PHQ9
SUM OF ALL RESPONSES TO PHQ QUESTIONS 1-9: 0
2. FEELING DOWN, DEPRESSED OR HOPELESS: NOT AT ALL
SUM OF ALL RESPONSES TO PHQ QUESTIONS 1-9: 0
SUM OF ALL RESPONSES TO PHQ QUESTIONS 1-9: 0
1. LITTLE INTEREST OR PLEASURE IN DOING THINGS: NOT AT ALL
SUM OF ALL RESPONSES TO PHQ QUESTIONS 1-9: 0
SUM OF ALL RESPONSES TO PHQ9 QUESTIONS 1 & 2: 0

## 2024-07-11 ASSESSMENT — ENCOUNTER SYMPTOMS
NAUSEA: 0
VOMITING: 0
ABDOMINAL DISTENTION: 0
SHORTNESS OF BREATH: 0
COUGH: 0

## 2024-07-11 NOTE — PATIENT INSTRUCTIONS
Medication changes:    May take an extra lasix for weight gain 3lb or 5 lb in 1 week    Testing Ordered:    Genetic testing- deferred to next visit    Labs today    Cardiac MRI-- we will work on getting this arranged at [facility], if we are unable to then you may need to come to [place] for test.     Referrals:     Please call [facility]  [address]  [place] [address]  Tel: [phone]     Please follow up with Cardiac Rehab at [facility]-- [phone].  Julisa Alves is the point person.  This is the closest program to your residence.       Other Recommendations:      - Record blood pressure and heart rate daily before medication and two hours after medication  - Record weight daily upon waking/after using the bathroom.   - Keep a written records of your weights and blood pressure and bring to your next appointment.   - Call the clinic at if you have a weight gain of 3 or more pounds overnight OR 5 or more pounds in one week, new/worsened shortness of breath or swelling, or if your blood pressure begins to consistently run below 90/60 and/or you begin to experience dizziness or lightheadedness. Our office phone number [phone] option 2.  - Ensure you are drinking an adequate amount of water with a goal of 6-8 eight ounce glasses (1.5-2 liters) of fluid daily. Your urine should be clear and light yellow straw colored.       Follow up in 6 weeks with [facility]    Thank you for allowing us the privilege of being a part of your healthcare team! Please do not hesitate to contact our office at [phone] option 2 with any questions or concerns.

## 2024-07-11 NOTE — PROGRESS NOTES
ADVANCED HEART FAILURE CENTER  [facility] in [place], [place]  Outpatient Clinic Note      Patient name: Yudith Park  Patient : 1975  Patient MRN: [id number]  Date of service: 24      Chief Complaint   Patient presents with    Congestive Heart Failure    Follow-up        ASSESSMENT:  Yudith Park is a 48 y.o. female admitted with acute systolic heart failure. Recent coronary CTA showing normal coronary arteries. Non ischemic cardiomyopathy. NYHA class IV symptoms on presentation, now class II.   Stable off Milrinone. Good 6 minute walk distance off inotrope. NICOM CI 2.3 off inotrope    Interval Events:  24 Yudith Park presents for HF follow up accompanied by her mother. She states she feels well today, denies acute HF symptoms. She started her Toprol and her BP has trended down but she is asymptomatic. She has increased her activity and is wearing her lifevest. She is planning on going on her cruise tomorrow but did purchase supplemental health insurance.   VS reviewed- BP lower on BB  Wt Stable  Labs- need today       RECOMMENDATIONS:  Medical Therapy for Heart Failure  Beta-blocker: Continue Toprol 12.5mg at night- no up titration with current HR  ACEi/ARB/ARNI: Continue Entresto 49-51 mg BID- no uptitration due to BP  Hydralazine/Nitrate: Hold Hydralazine/Nitrate until ARNI and beta blocker are maximized  MRA: Continue Spironolactone 25 mg daily  SGLT2i: Continue Jardiance 10 mg daily  Daily Vit D 2000 units daily     Volume Management  Continue Lasix 40 mg daily, can take an extra dose PRN weight gain or edema   Keep K>4 and Mg>2  Fluid restriction to 2000 cc daily, trend home standing weight    Laboratory and Imaging Studies  Echo reviewed  [facility] reviewed  Labs: HF labs today in clinic   Genetic testing at next visit- patient aware of cost    Physical activity and education  Ambulate daily  Nutritionist consult  Heart failure education by nurse navigator  Advanced care plan and

## 2024-07-12 LAB
ALBUMIN SERPL-MCNC: 4.1 G/DL (ref 3.5–5)
ALBUMIN/GLOB SERPL: 1 (ref 1.1–2.2)
ALP SERPL-CCNC: 67 U/L (ref 45–117)
ALT SERPL-CCNC: 40 U/L (ref 12–78)
ANION GAP SERPL CALC-SCNC: 8 MMOL/L (ref 5–15)
AST SERPL-CCNC: 24 U/L (ref 15–37)
BILIRUB SERPL-MCNC: 0.6 MG/DL (ref 0.2–1)
BUN SERPL-MCNC: 19 MG/DL (ref 6–20)
BUN/CREAT SERPL: 22 (ref 12–20)
CALCIUM SERPL-MCNC: 9.8 MG/DL (ref 8.5–10.1)
CHLORIDE SERPL-SCNC: 104 MMOL/L (ref 97–108)
CO2 SERPL-SCNC: 23 MMOL/L (ref 21–32)
CREAT SERPL-MCNC: 0.87 MG/DL (ref 0.55–1.02)
GLOBULIN SER CALC-MCNC: 4.1 G/DL (ref 2–4)
GLUCOSE SERPL-MCNC: 97 MG/DL (ref 65–100)
MAGNESIUM SERPL-MCNC: 2.1 MG/DL (ref 1.6–2.4)
NT PRO BNP: 224 PG/ML
POTASSIUM SERPL-SCNC: 4.5 MMOL/L (ref 3.5–5.1)
PROT SERPL-MCNC: 8.2 G/DL (ref 6.4–8.2)
SODIUM SERPL-SCNC: 135 MMOL/L (ref 136–145)

## 2024-07-15 ENCOUNTER — TELEPHONE (OUTPATIENT)
Age: 49
End: 2024-07-15

## 2024-07-15 NOTE — TELEPHONE ENCOUNTER
Tried to call [facility] imaging center (regarding [facility])- no answer, unable to leave a message-- recording says to try call again later--[phone]    7/23--called again, spoke with rep. She states order and office note needs to be faxed to [phone]. She states the patient will be called in apprx 1 month for scheduling    Order faxed to above.

## 2024-07-17 ENCOUNTER — TELEPHONE (OUTPATIENT)
Age: 49
End: 2024-07-17

## 2024-07-19 RX ORDER — FUROSEMIDE 40 MG/1
TABLET ORAL
Qty: 60 TABLET | Refills: 0 | Status: SHIPPED | OUTPATIENT
Start: 2024-07-19

## 2024-07-29 ENCOUNTER — PATIENT MESSAGE (OUTPATIENT)
Age: 49
End: 2024-07-29

## 2024-07-29 DIAGNOSIS — I50.21 ACUTE SYSTOLIC CONGESTIVE HEART FAILURE (HCC): Primary | ICD-10-CM

## 2024-07-29 NOTE — TELEPHONE ENCOUNTER
From: Yudith Park  To: Clover Baker  Sent: 7/29/2024  1:09 PM EDT  Subject: Cardiac Rehab at [facility]    Just spoke to them at - [phone] and they haven’t received the referral from you to schedule an appointment. Please contact them or fax number -- [phone]. Thank you!    7/29-Spoke with sarah Muniz to place updated referral for cardiac rehab, Faxed to above number with office note and echo

## 2024-08-13 ENCOUNTER — TELEPHONE (OUTPATIENT)
Age: 49
End: 2024-08-13

## 2024-08-13 NOTE — TELEPHONE ENCOUNTER
Patient called in to ave MRI faxed to [phone] attention to Luana...   After several attempts [facility] and received to fax number of [phone] and was successful.  Contacted patient to let her

## 2024-08-15 ENCOUNTER — TELEPHONE (OUTPATIENT)
Age: 49
End: 2024-08-15

## 2024-08-16 RX ORDER — FUROSEMIDE 40 MG/1
TABLET ORAL
Qty: 60 TABLET | Refills: 3 | Status: SHIPPED | OUTPATIENT
Start: 2024-08-16

## 2024-08-16 NOTE — TELEPHONE ENCOUNTER
Requested Prescriptions     Pending Prescriptions Disp Refills    furosemide (LASIX) 40 MG tablet [Pharmacy Med Name: FUROSEMIDE 40 MG TABLET] 60 tablet 3     Sig: TAKE ONE TABLET BY MOUTH DAILY MAY TAKE AN ADDITIONAL TABLET DAILY AS NEEDED FOR WEIGHT GAIN OF 3LBS OR MORE OVERNIGHT OR 5 LBS OR MORE IN ONE WEEK.     Last visit 7/11  Next visit 8/22

## 2024-08-22 ENCOUNTER — OFFICE VISIT (OUTPATIENT)
Age: 49
End: 2024-08-22

## 2024-08-22 VITALS
RESPIRATION RATE: 18 BRPM | SYSTOLIC BLOOD PRESSURE: 100 MMHG | HEIGHT: 65 IN | TEMPERATURE: 97.4 F | HEART RATE: 64 BPM | WEIGHT: 218 LBS | DIASTOLIC BLOOD PRESSURE: 68 MMHG | BODY MASS INDEX: 36.32 KG/M2 | OXYGEN SATURATION: 99 %

## 2024-08-22 DIAGNOSIS — I50.22 CHRONIC HFREF (HEART FAILURE WITH REDUCED EJECTION FRACTION) (HCC): Primary | ICD-10-CM

## 2024-08-22 DIAGNOSIS — I50.22 CHRONIC HFREF (HEART FAILURE WITH REDUCED EJECTION FRACTION) (HCC): ICD-10-CM

## 2024-08-22 DIAGNOSIS — Z79.899 ENCOUNTER FOR MONITORING DIURETIC THERAPY: ICD-10-CM

## 2024-08-22 DIAGNOSIS — E55.9 VITAMIN D DEFICIENCY: ICD-10-CM

## 2024-08-22 DIAGNOSIS — Z51.81 ENCOUNTER FOR MONITORING DIURETIC THERAPY: ICD-10-CM

## 2024-08-22 RX ORDER — METOPROLOL SUCCINATE 25 MG/1
12.5 TABLET, EXTENDED RELEASE ORAL DAILY
Qty: 15 TABLET | Refills: 1 | Status: SHIPPED | OUTPATIENT
Start: 2024-08-22

## 2024-08-22 RX ORDER — IBUPROFEN 800 MG/1
800 TABLET ORAL EVERY 8 HOURS PRN
COMMUNITY
Start: 2024-08-16

## 2024-08-22 ASSESSMENT — PATIENT HEALTH QUESTIONNAIRE - PHQ9
1. LITTLE INTEREST OR PLEASURE IN DOING THINGS: NOT AT ALL
SUM OF ALL RESPONSES TO PHQ QUESTIONS 1-9: 0
SUM OF ALL RESPONSES TO PHQ9 QUESTIONS 1 & 2: 0
SUM OF ALL RESPONSES TO PHQ QUESTIONS 1-9: 0

## 2024-08-22 ASSESSMENT — ENCOUNTER SYMPTOMS
VOMITING: 0
COUGH: 0
NAUSEA: 0
ABDOMINAL DISTENTION: 0
SHORTNESS OF BREATH: 0

## 2024-08-22 NOTE — TELEPHONE ENCOUNTER
Requested Prescriptions     Pending Prescriptions Disp Refills    metoprolol succinate (TOPROL XL) 25 MG extended release tablet [Pharmacy Med Name: METOPROLOL SUCC ER 25 MG TAB] 15 tablet 1     Sig: TAKE 1/2 TABLET BY MOUTH DAILY      Last appt 8/22  Next appt TBD 1 month (on wait list)

## 2024-08-22 NOTE — PATIENT INSTRUCTIONS
Medication Changes:    NONE         Testing Ordered:    LAB with genetic testing - clinic performed  CARDIAC MRI - scheduled with Erin    Referrals:      Other Recommendations:      - Record blood pressure and heart rate daily before medication and two hours after medication  - Record weight daily upon waking/after using the bathroom.   - Keep a written records of your weights and blood pressure and bring to your next appointment.   - Call the clinic at if you have a weight gain of 3 or more pounds overnight OR 5 or more pounds in one week, new/worsened shortness of breath or swelling, or if your blood pressure begins to consistently run below 90/60 and/or you begin to experience dizziness or lightheadedness. Our office phone number [phone] option 2.  - Ensure you are drinking an adequate amount of water with a goal of 6-8 eight ounce glasses (1.5-2 liters) of fluid daily. Your urine should be clear and light yellow straw colored.       Follow up 1 month with [facility]    Thank you for allowing us the privilege of being a part of your healthcare team! Please do not hesitate to contact our office at [phone] option 2 with any questions or concerns.     Please make sure to have an active My Chart account. Having issues logging in? Contact the [facility] Help Desk at [phone].   - [employer] is the best way to communicate with [facility] Nurses. We can answer your questions, you can report your weight and BP logs and we notify you of any abnormal results requiring changes to your current plan of care.     We are restarting a monthly heart failure support group, this will be the last Wednesday of every month from 5-6pm at [facility]. If you would like to attend you will need to RSVP to [email or web address]

## 2024-08-22 NOTE — PROGRESS NOTES
to participate in this patient's care.    INDIANA Chand NP  Advanced Heart Failure Center  [facility]  [address], [address]  Phone: [phone]    On this date 8/22/24 , I have spent a total time of  35 minutes personally reviewing new vitals, test results, notes from recent visits, face to face encounter/physical exam of patient with counseling, writing orders, performing medical decision making, and documenting.

## 2024-08-23 LAB
25(OH)D3 SERPL-MCNC: 38 NG/ML (ref 30–100)
ALBUMIN SERPL-MCNC: 4 G/DL (ref 3.5–5)
ALBUMIN/GLOB SERPL: 1 (ref 1.1–2.2)
ALP SERPL-CCNC: 53 U/L (ref 45–117)
ALT SERPL-CCNC: 20 U/L (ref 12–78)
ANION GAP SERPL CALC-SCNC: 5 MMOL/L (ref 5–15)
AST SERPL-CCNC: 15 U/L (ref 15–37)
BILIRUB SERPL-MCNC: 0.5 MG/DL (ref 0.2–1)
BUN SERPL-MCNC: 26 MG/DL (ref 6–20)
BUN/CREAT SERPL: 27 (ref 12–20)
CALCIUM SERPL-MCNC: 9.1 MG/DL (ref 8.5–10.1)
CHLORIDE SERPL-SCNC: 109 MMOL/L (ref 97–108)
CO2 SERPL-SCNC: 23 MMOL/L (ref 21–32)
CREAT SERPL-MCNC: 0.97 MG/DL (ref 0.55–1.02)
GLOBULIN SER CALC-MCNC: 4 G/DL (ref 2–4)
GLUCOSE SERPL-MCNC: 98 MG/DL (ref 65–100)
MAGNESIUM SERPL-MCNC: 2.4 MG/DL (ref 1.6–2.4)
NT PRO BNP: 241 PG/ML
POTASSIUM SERPL-SCNC: 4.1 MMOL/L (ref 3.5–5.1)
PROT SERPL-MCNC: 8 G/DL (ref 6.4–8.2)
SODIUM SERPL-SCNC: 137 MMOL/L (ref 136–145)

## 2024-09-13 ENCOUNTER — TELEPHONE (OUTPATIENT)
Age: 49
End: 2024-09-13

## 2024-09-18 ENCOUNTER — TELEPHONE (OUTPATIENT)
Age: 49
End: 2024-09-18

## 2024-09-19 ENCOUNTER — TELEPHONE (OUTPATIENT)
Age: 49
End: 2024-09-19

## 2024-09-23 RX ORDER — EMPAGLIFLOZIN 10 MG/1
10 TABLET, FILM COATED ORAL DAILY
Qty: 30 TABLET | Refills: 3 | Status: SHIPPED | OUTPATIENT
Start: 2024-09-23

## 2024-09-25 RX ORDER — SACUBITRIL AND VALSARTAN 49; 51 MG/1; MG/1
1 TABLET, FILM COATED ORAL 2 TIMES DAILY
Qty: 60 TABLET | Refills: 2 | Status: SHIPPED | OUTPATIENT
Start: 2024-09-25

## 2024-10-14 ENCOUNTER — TELEPHONE (OUTPATIENT)
Age: 49
End: 2024-10-14

## 2024-10-14 NOTE — TELEPHONE ENCOUNTER
Pt called back after missing Jimmy's call.  When I told her that Jimmy would be back in touch tomorrow b/c clinic was closed, she said that it was emergent.  I spoke with Jimmy who referred her to the ER or provider on call.  She did not like the ER idea and said that she just needed to speak with someone about her med.  I then lost her call and was unsuccessful in reaching her back after three attempts.

## 2024-10-21 RX ORDER — SPIRONOLACTONE 25 MG/1
25 TABLET ORAL DAILY
Qty: 90 TABLET | Refills: 0 | Status: SHIPPED | OUTPATIENT
Start: 2024-10-21

## 2024-10-21 NOTE — TELEPHONE ENCOUNTER
Last visit 8/22  Next visit to be scheduled for around 11/22- pt on wait list       Requested Prescriptions     Pending Prescriptions Disp Refills    spironolactone (ALDACTONE) 25 MG tablet [Pharmacy Med Name: SPIRONOLACTONE 25 MG TABLET] 90 tablet 0     Sig: TAKE 1 TABLET BY MOUTH EVERY DAY

## 2024-10-22 RX ORDER — METOPROLOL SUCCINATE 25 MG/1
12.5 TABLET, EXTENDED RELEASE ORAL DAILY
Qty: 30 TABLET | Refills: 0 | Status: SHIPPED | OUTPATIENT
Start: 2024-10-22

## 2024-10-22 NOTE — TELEPHONE ENCOUNTER
Requested Prescriptions     Pending Prescriptions Disp Refills    metoprolol succinate (TOPROL XL) 25 MG extended release tablet [Pharmacy Med Name: METOPROLOL SUCC ER 25 MG TAB] 30 tablet 0     Sig: TAKE 1/2 TABLET BY MOUTH DAILY      Last appt 8/22  Next appt TBD on wait list for after [facility] mid November

## 2024-11-15 ENCOUNTER — TELEPHONE (OUTPATIENT)
Age: 49
End: 2024-11-15

## 2024-11-19 ENCOUNTER — TELEMEDICINE (OUTPATIENT)
Age: 49
End: 2024-11-19
Payer: COMMERCIAL

## 2024-11-19 ENCOUNTER — TELEPHONE (OUTPATIENT)
Age: 49
End: 2024-11-19

## 2024-11-19 DIAGNOSIS — I42.8 NON-COMPACTION CARDIOMYOPATHY (HCC): ICD-10-CM

## 2024-11-19 DIAGNOSIS — I50.22 CHRONIC HFREF (HEART FAILURE WITH REDUCED EJECTION FRACTION) (HCC): Primary | ICD-10-CM

## 2024-11-19 PROCEDURE — 99213 OFFICE O/P EST LOW 20 MIN: CPT | Performed by: NURSE PRACTITIONER

## 2024-11-19 RX ORDER — METOPROLOL SUCCINATE 25 MG/1
12.5 TABLET, EXTENDED RELEASE ORAL DAILY
Qty: 30 TABLET | Refills: 1 | Status: SHIPPED | OUTPATIENT
Start: 2024-11-19

## 2024-11-19 RX ORDER — FUROSEMIDE 40 MG/1
TABLET ORAL
Qty: 60 TABLET | Refills: 3 | Status: SHIPPED | OUTPATIENT
Start: 2024-11-19

## 2024-11-19 RX ORDER — ASPIRIN 81 MG/1
81 TABLET ORAL DAILY
Qty: 90 TABLET | Refills: 1 | Status: SHIPPED | OUTPATIENT
Start: 2024-11-19

## 2024-11-19 RX ORDER — SACUBITRIL AND VALSARTAN 49; 51 MG/1; MG/1
1 TABLET, FILM COATED ORAL 2 TIMES DAILY
Qty: 60 TABLET | Refills: 2 | Status: SHIPPED | OUTPATIENT
Start: 2024-11-19

## 2024-11-19 RX ORDER — SPIRONOLACTONE 25 MG/1
25 TABLET ORAL DAILY
Qty: 90 TABLET | Refills: 1 | Status: SHIPPED | OUTPATIENT
Start: 2024-11-19

## 2024-11-19 ASSESSMENT — PATIENT HEALTH QUESTIONNAIRE - PHQ9
SUM OF ALL RESPONSES TO PHQ QUESTIONS 1-9: 0
2. FEELING DOWN, DEPRESSED OR HOPELESS: NOT AT ALL
SUM OF ALL RESPONSES TO PHQ9 QUESTIONS 1 & 2: 0
SUM OF ALL RESPONSES TO PHQ QUESTIONS 1-9: 0
1. LITTLE INTEREST OR PLEASURE IN DOING THINGS: NOT AT ALL

## 2024-11-19 ASSESSMENT — ENCOUNTER SYMPTOMS
COUGH: 0
ABDOMINAL DISTENTION: 0
CHEST TIGHTNESS: 0
ABDOMINAL PAIN: 0
SORE THROAT: 0
SHORTNESS OF BREATH: 0
EYE PAIN: 0
BLOOD IN STOOL: 0

## 2024-11-19 NOTE — TELEPHONE ENCOUNTER
LVM for Pt. To call and make an appointment with Dr. Herrmann.    DX: Eval. For ICD    Referral Scanned.

## 2024-11-19 NOTE — PATIENT INSTRUCTIONS
Medication changes:    -start taking 81mg aspirin daily  -I have sent refills for most of your other medications       Please take this to your pharmacy to notify them of the change in medications.         Testing Ordered:    -please obtain your lab work, as planned.        Any labs drawn in clinic will usually result the next day.  For normal lab values, we will reach out to you via [employer].   If any concerns or changes needed, a nurse will call you to go over your results.    [facility] labs will usually take longer to result, but you will again be notified either via [employer] or a phone call.          Referrals:    -please keep your appointment with Dr. Herrmann  - please remember to follow up with your physician about your mammogram.       Other Recommendations:      - Record blood pressure and heart rate daily before medication and two hours after medication  - Record weight daily upon waking/after using the bathroom.   - Keep a written records of your weights and blood pressure and bring to your next appointment.   - Call the clinic at if you have a weight gain of 3 or more pounds overnight OR 5 or more pounds in one week, new/worsened shortness of breath or swelling, or if your blood pressure begins to consistently run below 90/60 and/or you begin to experience dizziness or lightheadedness. Our office phone number [phone] option 2.  - Ensure you are drinking an adequate amount of water with a goal of 6-8 eight ounce glasses (1.5-2 liters) of fluid daily. Your urine should be clear and light yellow straw colored.       Follow up in 3 months with NP  with [facility]    Thank you for allowing us the privilege of being a part of your healthcare team! Please do not hesitate to contact our office at [phone] option 2 with any questions or concerns.     We are restarting a monthly heart failure support group, this will be the last Wednesday of every month from 5-6pm at [facility]. If you would like

## 2024-11-19 NOTE — PROGRESS NOTES
[facility]  [facility] in [place], [place]  Outpatient Clinic Note      Patient name: Yudith Park  Patient : 1975  Patient MRN: [id number]  Date of service: 24      Chief Complaint   Patient presents with    Congestive Heart Failure        ASSESSMENT:  Yudith Park is a 48 y.o. female admitted with acute systolic heart failure. Recent coronary CTA showing normal coronary arteries. Non ischemic cardiomyopathy. NYHA class IV symptoms on presentation, now class II.   Stable off Milrinone. Good 6 minute walk distance off inotrope. 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  EF remains low at 25%        INTERVAL HISTORY:  -home vitals stable   -labs -no recent labs.    -weight 223 at home.  States it seems she is slowly gaining some weight  -cMRI: 24:  EF 25%; RVEF 36%; LVEDP severely increased; hypertrabeculated left ventricular myocardium noted; may represent LV noncompaction   -Yudith Park is feeling well.  She doesn't have SOB with activities, but she does pace herself more than prior to her diagnosis.  No swelling, chest pain, palpitations, dizziness, orthopnea.  She doesn't sleep well, but will have a sleep study in January.   She is wearing her Lifevest.  She is no longer smoking - quit .  We went over her cMRI results in detail.          RECOMMENDATIONS:  Medical Therapy for Heart Failure  Beta-blocker: Continue Toprol 12.5mg at night- no up titration with current HR  ACEi/ARB/ARNI: Continue Entresto 49-51 mg BID- no uptitration due to BP  Hydralazine/Nitrate: None due to hypotension   MRA: Continue Spironolactone 25 mg daily  SGLT2i: Continue Jardiance 10 mg daily  Daily Vit D 2000 units daily   Continue Lasix 40 mg daily, can take an extra dose PRN weight gain or edema   Keep K>4 and Mg>2  Fluid restriction to 2000 cc daily, trend home standing weight  Needs updated labs - slips given      Risk of sudden cardiac death:

## 2024-12-10 PROBLEM — I10 PRIMARY HYPERTENSION: Status: ACTIVE | Noted: 2024-12-10

## 2024-12-10 PROBLEM — I50.22 CHRONIC SYSTOLIC CONGESTIVE HEART FAILURE (HCC): Status: ACTIVE | Noted: 2024-06-19

## 2024-12-10 PROBLEM — I42.9 CARDIOMYOPATHY (HCC): Status: ACTIVE | Noted: 2024-12-10

## 2024-12-10 NOTE — PROGRESS NOTES
Cardiac Electrophysiology OFFICE Consultation Note       Assessment/Plan:   1. Chronic systolic congestive heart failure (HCC)  -     EKG 12 Lead  2. Primary hypertension  3. Cardiomyopathy, unspecified type (HCC)  4. Left bundle branch block       Primary Cardiologist: Advanced HF     Chronic systolic heart failure:  - Initially diagnosed 6/2024, admitted to [facility] with acute systolic heart failure. LVEF 10-15%.  - Coronary CTA 3/29/24 showing normal coronaries per heart failure note  - Cardiac MRI with LVEF 25.1%, RVEF 36.2%. No prior ischemic myocardial damage, no definite evidence of interstitial fibrosis suggestive of infiltrative disease. Hyper trabeculated LV noted, which may represent LV non-compaction.   - Has lifevest but per patient it isn't working properly, she has contacted [employer] and they are going to assist her.  - On GDMT with Metoprolol, Aldactone, Entresto, and Jardiance.   - EKG today sinus bradycardia 54 bpm  ms  - in the setting of niCMP, LVEF of 25%, NYHA class II symptoms, LBBB with QRS of 182 ms, she meets class I indication for CRT-D.  - I have discussed the risks and benefits of ICD implant with the pt including but not limited to MI, death, bleeding, infection, lead dislodgement, pneumothorax, tamponade.  We have reviewed an evidence-based tool (https://patientdecisionaid.org/wp-content/uploads/2016/06/ICD-tool-shortened-V1-3-.pdf), all questions were answered and patient reports full understanding of discussion and wish to proceed with the procedure.  - Recommend BIV ICD implant next available  - Obtain labs prior to procedure    Nonischemic cardiomyopathy:  - See above    Hypertension:  - BP well controlled  - Continue current meds    LBBB  Proceed with CRT-D as noted above      Patient is an new patient with plan for longitudinal relationship and ongoing assessment and management of device management) as a focal point of continued medical care.   - FU in EP in 3-4 months

## 2024-12-11 ENCOUNTER — OFFICE VISIT (OUTPATIENT)
Age: 49
End: 2024-12-11

## 2024-12-11 VITALS
HEART RATE: 68 BPM | BODY MASS INDEX: 36.65 KG/M2 | WEIGHT: 220 LBS | HEIGHT: 65 IN | SYSTOLIC BLOOD PRESSURE: 110 MMHG | OXYGEN SATURATION: 96 % | DIASTOLIC BLOOD PRESSURE: 60 MMHG

## 2024-12-11 DIAGNOSIS — I42.9 CARDIOMYOPATHY, UNSPECIFIED TYPE (HCC): ICD-10-CM

## 2024-12-11 DIAGNOSIS — I50.22 CHRONIC SYSTOLIC HEART FAILURE (HCC): ICD-10-CM

## 2024-12-11 DIAGNOSIS — I10 PRIMARY HYPERTENSION: ICD-10-CM

## 2024-12-11 DIAGNOSIS — I50.22 CHRONIC SYSTOLIC CONGESTIVE HEART FAILURE (HCC): Primary | ICD-10-CM

## 2024-12-11 DIAGNOSIS — I44.7 LEFT BUNDLE BRANCH BLOCK: ICD-10-CM

## 2024-12-11 RX ORDER — CHLORHEXIDINE GLUCONATE 40 MG/ML
SOLUTION TOPICAL NIGHTLY
Qty: 1 EACH | Refills: 0 | Status: SHIPPED | OUTPATIENT
Start: 2024-12-11

## 2024-12-11 NOTE — PROGRESS NOTES
Chief Complaint   Patient presents with    Cardiomyopathy     HF     Vitals:    12/11/24 1038   BP: 110/60   Site: Left Upper Arm   Position: Sitting   Pulse: 68   SpO2: 96%   Weight: 99.8 kg (220 lb)   Height: 1.651 m (5' 5\")     Chest pain: DENIED     Recent hospital stays: DENIED     Refills: DENIED

## 2024-12-11 NOTE — PATIENT INSTRUCTIONS
all times.  You should receive your permanent card, although this may take up to 8-12 weeks.  If you do not receive your permanent card, please call the office at [phone] or the phone number provided on your temporary card for the ICD company.       SYMPTOMS THAT NEED TO BE REPORTED IMMEDIATELY      Temperature more than 100.4 F    Redness or warmth at the incision site, or pain for longer than the first 5 days after the implant.    Drainage from the incision site.    Swelling around the incision site.    Shortness of breath.    Rapid heart rate or palpitations.    Dizziness, lightheadedness, fainting.    Slow pulse below 40 beats per minute.    REMEMBER: If you feel something is an emergency or cannot be handled over the phone, call 911 or go to the closest emergency room.    WHAT TO DO IF YOUR ICD DELIVERS A SHOCK     If your ICD delivers a shock, you should seek attention at the nearest emergency room, call our office or call 911.    FOLLOW UP      If you have any questions in reference to the procedure please call me at [phone].   All clinical questions have to go through the Nurse and the main number [phone] or MyChart    Thank You,  Chanell MONIQUE/        Poncho Herrmann MD  Cardiac Electrophysiology / Cardiology    [facility] [place]  [facility]  [address] [facility], [address]        [address] [place] [address], [facility] [address]  [address]  [address] [phone] / [phone] Fax       [phone] / [phone] Fax

## 2024-12-18 ENCOUNTER — TELEPHONE (OUTPATIENT)
Age: 49
End: 2024-12-18

## 2024-12-26 ENCOUNTER — PREP FOR PROCEDURE (OUTPATIENT)
Age: 49
End: 2024-12-26

## 2024-12-26 RX ORDER — SODIUM CHLORIDE 0.9 % (FLUSH) 0.9 %
5-40 SYRINGE (ML) INJECTION PRN
Status: CANCELLED | OUTPATIENT
Start: 2024-12-26

## 2024-12-26 RX ORDER — SODIUM CHLORIDE 0.9 % (FLUSH) 0.9 %
5-40 SYRINGE (ML) INJECTION EVERY 12 HOURS SCHEDULED
Status: CANCELLED | OUTPATIENT
Start: 2024-12-26

## 2024-12-26 RX ORDER — SODIUM CHLORIDE 9 MG/ML
INJECTION, SOLUTION INTRAVENOUS PRN
Status: CANCELLED | OUTPATIENT
Start: 2024-12-26

## 2025-01-03 ENCOUNTER — TELEPHONE (OUTPATIENT)
Age: 50
End: 2025-01-03

## 2025-01-03 DIAGNOSIS — I50.22 CHRONIC SYSTOLIC HEART FAILURE (HCC): Primary | ICD-10-CM

## 2025-01-03 NOTE — TELEPHONE ENCOUNTER
Spoke To Pt:  Just a reminder not to forget your New Implant BIV ICD scheduled for 1/6/25 Monday.  Arriving at 12:30pm  You will need a  must stay on site  NPO from midnight   check in at the second floor outpt. reg. Desk.  Medications:  Hold Lasix day of procedure  Hold Jardiance day of procedure  Hold Spironolactone day of procedure  VO by Dr. Herrmann/nurse Chanell ZAZUETA      Asked pt about her lab work she had it done at her PCP Sanaz Mathew [phone] I have called to get the lab work.

## 2025-01-06 ENCOUNTER — TELEPHONE (OUTPATIENT)
Age: 50
End: 2025-01-06

## 2025-01-06 NOTE — TELEPHONE ENCOUNTER
Patient received a call from the hospital telling her to reschedule her ICD insert for today due to the weather and because she had to drive in 3 hours away.Please give a call back to reschedule.    [phone]   Yes

## 2025-01-07 NOTE — TELEPHONE ENCOUNTER
Spoke to pt of procedure rescheduled from 1/6/25 to 1/17/25 at 7:30am at [facility] with Dr. Herrmann

## 2025-01-17 ENCOUNTER — ANESTHESIA (OUTPATIENT)
Facility: HOSPITAL | Age: 50
End: 2025-01-17
Payer: COMMERCIAL

## 2025-01-17 ENCOUNTER — ANESTHESIA EVENT (OUTPATIENT)
Facility: HOSPITAL | Age: 50
End: 2025-01-17
Payer: COMMERCIAL

## 2025-01-17 ENCOUNTER — HOSPITAL ENCOUNTER (OUTPATIENT)
Facility: HOSPITAL | Age: 50
Discharge: HOME | End: 2025-01-17
Attending: INTERNAL MEDICINE | Admitting: INTERNAL MEDICINE
Payer: COMMERCIAL

## 2025-01-17 ENCOUNTER — APPOINTMENT (OUTPATIENT)
Facility: HOSPITAL | Age: 50
End: 2025-01-17
Attending: INTERNAL MEDICINE
Payer: COMMERCIAL

## 2025-01-17 VITALS
HEART RATE: 66 BPM | BODY MASS INDEX: 37.49 KG/M2 | RESPIRATION RATE: 20 BRPM | WEIGHT: 225 LBS | OXYGEN SATURATION: 96 % | SYSTOLIC BLOOD PRESSURE: 102 MMHG | HEIGHT: 65 IN | DIASTOLIC BLOOD PRESSURE: 63 MMHG | TEMPERATURE: 98.1 F

## 2025-01-17 DIAGNOSIS — I50.22 CHRONIC SYSTOLIC HEART FAILURE (HCC): ICD-10-CM

## 2025-01-17 LAB
ECHO BSA: 2.16 M2
ECHO BSA: 2.16 M2
GLUCOSE BLD STRIP.AUTO-MCNC: 158 MG/DL (ref 65–117)
SERVICE CMNT-IMP: ABNORMAL

## 2025-01-17 PROCEDURE — 76937 US GUIDE VASCULAR ACCESS: CPT | Performed by: INTERNAL MEDICINE

## 2025-01-17 PROCEDURE — 3700000000 HC ANESTHESIA ATTENDED CARE: Performed by: INTERNAL MEDICINE

## 2025-01-17 PROCEDURE — C1900 LEAD, CORONARY VENOUS: HCPCS | Performed by: INTERNAL MEDICINE

## 2025-01-17 PROCEDURE — 6360000002 HC RX W HCPCS

## 2025-01-17 PROCEDURE — C1892 INTRO/SHEATH,FIXED,PEEL-AWAY: HCPCS | Performed by: INTERNAL MEDICINE

## 2025-01-17 PROCEDURE — C1777 LEAD, AICD, ENDO SINGLE COIL: HCPCS | Performed by: INTERNAL MEDICINE

## 2025-01-17 PROCEDURE — C1769 GUIDE WIRE: HCPCS | Performed by: INTERNAL MEDICINE

## 2025-01-17 PROCEDURE — 33225 L VENTRIC PACING LEAD ADD-ON: CPT | Performed by: INTERNAL MEDICINE

## 2025-01-17 PROCEDURE — C1898 LEAD, PMKR, OTHER THAN TRANS: HCPCS | Performed by: INTERNAL MEDICINE

## 2025-01-17 PROCEDURE — 82962 GLUCOSE BLOOD TEST: CPT

## 2025-01-17 PROCEDURE — C1882 AICD, OTHER THAN SING/DUAL: HCPCS | Performed by: INTERNAL MEDICINE

## 2025-01-17 PROCEDURE — 6360000002 HC RX W HCPCS: Performed by: INTERNAL MEDICINE

## 2025-01-17 PROCEDURE — C1725 CATH, TRANSLUMIN NON-LASER: HCPCS | Performed by: INTERNAL MEDICINE

## 2025-01-17 PROCEDURE — 6370000000 HC RX 637 (ALT 250 FOR IP)

## 2025-01-17 PROCEDURE — 6360000004 HC RX CONTRAST MEDICATION: Performed by: INTERNAL MEDICINE

## 2025-01-17 PROCEDURE — 3700000001 HC ADD 15 MINUTES (ANESTHESIA): Performed by: INTERNAL MEDICINE

## 2025-01-17 PROCEDURE — 33249 INSJ/RPLCMT DEFIB W/LEAD(S): CPT | Performed by: INTERNAL MEDICINE

## 2025-01-17 PROCEDURE — 2580000003 HC RX 258

## 2025-01-17 PROCEDURE — 2500000003 HC RX 250 WO HCPCS

## 2025-01-17 PROCEDURE — 71045 X-RAY EXAM CHEST 1 VIEW: CPT

## 2025-01-17 PROCEDURE — 2709999900 HC NON-CHARGEABLE SUPPLY: Performed by: INTERNAL MEDICINE

## 2025-01-17 DEVICE — LEAD 6935M62 QUATTRO SECURE S MRI US
Type: IMPLANTABLE DEVICE | Status: FUNCTIONAL
Brand: SPRINT QUATTRO SECURE S MRI™ SURESCAN™

## 2025-01-17 DEVICE — LEAD 5076-52 MRI US RCMCRD
Type: IMPLANTABLE DEVICE | Status: FUNCTIONAL
Brand: CAPSUREFIX NOVUS MRI™ SURESCAN®

## 2025-01-17 DEVICE — LEAD 439888 MRI STRAIGHT US
Type: IMPLANTABLE DEVICE | Status: FUNCTIONAL
Brand: ATTAIN PERFORMA™ STRAIGHT MRI SURESCAN™

## 2025-01-17 DEVICE — CRTD DTPA2QQ COBALT XT HF QUAD MRI DF4
Type: IMPLANTABLE DEVICE | Status: FUNCTIONAL
Brand: COBALT™ XT HF QUAD CRT-D MRI SURESCAN™

## 2025-01-17 RX ORDER — HYDROCODONE BITARTRATE AND ACETAMINOPHEN 5; 325 MG/1; MG/1
1 TABLET ORAL ONCE
Status: COMPLETED | OUTPATIENT
Start: 2025-01-17 | End: 2025-01-17

## 2025-01-17 RX ORDER — LIDOCAINE HYDROCHLORIDE 10 MG/ML
INJECTION, SOLUTION INFILTRATION; PERINEURAL PRN
Status: DISCONTINUED | OUTPATIENT
Start: 2025-01-17 | End: 2025-01-17 | Stop reason: HOSPADM

## 2025-01-17 RX ORDER — LIDOCAINE HYDROCHLORIDE 20 MG/ML
INJECTION, SOLUTION EPIDURAL; INFILTRATION; INTRACAUDAL; PERINEURAL
Status: DISCONTINUED | OUTPATIENT
Start: 2025-01-17 | End: 2025-01-17 | Stop reason: SDUPTHER

## 2025-01-17 RX ORDER — SODIUM CHLORIDE 0.9 % (FLUSH) 0.9 %
5-40 SYRINGE (ML) INJECTION EVERY 12 HOURS SCHEDULED
Status: DISCONTINUED | OUTPATIENT
Start: 2025-01-17 | End: 2025-01-20 | Stop reason: HOSPADM

## 2025-01-17 RX ORDER — FENTANYL CITRATE 50 UG/ML
INJECTION, SOLUTION INTRAMUSCULAR; INTRAVENOUS
Status: DISCONTINUED | OUTPATIENT
Start: 2025-01-17 | End: 2025-01-17 | Stop reason: SDUPTHER

## 2025-01-17 RX ORDER — MIDAZOLAM HYDROCHLORIDE 1 MG/ML
INJECTION, SOLUTION INTRAMUSCULAR; INTRAVENOUS
Status: DISCONTINUED | OUTPATIENT
Start: 2025-01-17 | End: 2025-01-17 | Stop reason: SDUPTHER

## 2025-01-17 RX ORDER — DEXMEDETOMIDINE HYDROCHLORIDE 100 UG/ML
INJECTION, SOLUTION INTRAVENOUS
Status: DISCONTINUED | OUTPATIENT
Start: 2025-01-17 | End: 2025-01-17 | Stop reason: SDUPTHER

## 2025-01-17 RX ORDER — ONDANSETRON 2 MG/ML
4 INJECTION INTRAMUSCULAR; INTRAVENOUS EVERY 6 HOURS PRN
Status: DISCONTINUED | OUTPATIENT
Start: 2025-01-17 | End: 2025-01-20 | Stop reason: HOSPADM

## 2025-01-17 RX ORDER — ONDANSETRON 2 MG/ML
INJECTION INTRAMUSCULAR; INTRAVENOUS
Status: DISCONTINUED | OUTPATIENT
Start: 2025-01-17 | End: 2025-01-17 | Stop reason: SDUPTHER

## 2025-01-17 RX ORDER — ACETAMINOPHEN 325 MG/1
650 TABLET ORAL EVERY 4 HOURS PRN
Status: DISCONTINUED | OUTPATIENT
Start: 2025-01-17 | End: 2025-01-20 | Stop reason: HOSPADM

## 2025-01-17 RX ORDER — IOPAMIDOL 755 MG/ML
INJECTION, SOLUTION INTRAVASCULAR PRN
Status: DISCONTINUED | OUTPATIENT
Start: 2025-01-17 | End: 2025-01-17 | Stop reason: HOSPADM

## 2025-01-17 RX ORDER — SODIUM CHLORIDE 9 MG/ML
INJECTION, SOLUTION INTRAVENOUS
Status: DISCONTINUED | OUTPATIENT
Start: 2025-01-17 | End: 2025-01-17 | Stop reason: SDUPTHER

## 2025-01-17 RX ORDER — CEFAZOLIN SODIUM 1 G/3ML
INJECTION, POWDER, FOR SOLUTION INTRAMUSCULAR; INTRAVENOUS
Status: DISCONTINUED | OUTPATIENT
Start: 2025-01-17 | End: 2025-01-17 | Stop reason: SDUPTHER

## 2025-01-17 RX ORDER — SODIUM CHLORIDE 0.9 % (FLUSH) 0.9 %
5-40 SYRINGE (ML) INJECTION PRN
Status: DISCONTINUED | OUTPATIENT
Start: 2025-01-17 | End: 2025-01-20 | Stop reason: HOSPADM

## 2025-01-17 RX ADMIN — MIDAZOLAM 2 MG: 1 INJECTION INTRAMUSCULAR; INTRAVENOUS at 10:19

## 2025-01-17 RX ADMIN — HYDROCODONE BITARTRATE AND ACETAMINOPHEN 1 TABLET: 5; 325 TABLET ORAL at 12:20

## 2025-01-17 RX ADMIN — DEXMEDETOMIDINE 2 MCG: 100 INJECTION, SOLUTION INTRAVENOUS at 10:38

## 2025-01-17 RX ADMIN — SODIUM CHLORIDE: 9 INJECTION, SOLUTION INTRAVENOUS at 09:40

## 2025-01-17 RX ADMIN — FENTANYL CITRATE 25 MCG: 50 INJECTION INTRAMUSCULAR; INTRAVENOUS at 10:20

## 2025-01-17 RX ADMIN — ONDANSETRON 4 MG: 2 INJECTION INTRAMUSCULAR; INTRAVENOUS at 10:19

## 2025-01-17 RX ADMIN — FENTANYL CITRATE 25 MCG: 50 INJECTION INTRAMUSCULAR; INTRAVENOUS at 10:36

## 2025-01-17 RX ADMIN — LIDOCAINE HYDROCHLORIDE 60 MG: 20 INJECTION, SOLUTION EPIDURAL; INFILTRATION; INTRACAUDAL; PERINEURAL at 10:19

## 2025-01-17 RX ADMIN — DEXMEDETOMIDINE 2 MCG: 100 INJECTION, SOLUTION INTRAVENOUS at 10:31

## 2025-01-17 RX ADMIN — PROPOFOL 20 MCG/KG/MIN: 10 INJECTION, EMULSION INTRAVENOUS at 10:19

## 2025-01-17 RX ADMIN — PHENYLEPHRINE HYDROCHLORIDE 20 MCG/MIN: 10 INJECTION INTRAVENOUS at 09:50

## 2025-01-17 RX ADMIN — FENTANYL CITRATE 25 MCG: 50 INJECTION INTRAMUSCULAR; INTRAVENOUS at 10:32

## 2025-01-17 RX ADMIN — FENTANYL CITRATE 25 MCG: 50 INJECTION INTRAMUSCULAR; INTRAVENOUS at 10:40

## 2025-01-17 RX ADMIN — CEFAZOLIN 2 G: 330 INJECTION, POWDER, FOR SOLUTION INTRAMUSCULAR; INTRAVENOUS at 10:14

## 2025-01-17 ASSESSMENT — PAIN - FUNCTIONAL ASSESSMENT: PAIN_FUNCTIONAL_ASSESSMENT: PREVENTS OR INTERFERES SOME ACTIVE ACTIVITIES AND ADLS

## 2025-01-17 ASSESSMENT — PAIN DESCRIPTION - ORIENTATION: ORIENTATION: LEFT

## 2025-01-17 ASSESSMENT — PAIN SCALES - GENERAL: PAINLEVEL_OUTOF10: 8

## 2025-01-17 ASSESSMENT — PAIN DESCRIPTION - DESCRIPTORS: DESCRIPTORS: ACHING

## 2025-01-17 ASSESSMENT — PAIN DESCRIPTION - LOCATION: LOCATION: CHEST

## 2025-01-17 NOTE — PROGRESS NOTES
Ambulated patient to the bathroom with a steady gait, voided without difficulty. Patient denies chest pain, shortness of breath, or dizziness. Returned to stretcher. Vital signs stable.     Procedural site is clean, dry, and intact. No bleeding, no hematoma.     Assisted patient in dressing/Patient dressed self.     Educated patient about their sedation precautions such as not driving, operating any machinery, or signing legal documents 24 hours post procedure.     Reviewed discharge instructions, including medications and site care using the teach back method.  Answered all questions. Verbalized understanding.     Removed peripheral IV.    Escorted to discharge area in a wheelchair with all of their belongings including Cell phone,discharge instructions and pacemaker booklet/card    Patient's friend / [employer] to take patient home. Reviewed discharge instructions with patients' friend, they verbalized understanding.

## 2025-01-17 NOTE — ANESTHESIA POSTPROCEDURE EVALUATION
Post-Anesthesia Evaluation and Assessment    Patient: Yudith Park MRN: [id number]  SSN: [id number]    YOB: 1975  Age: 49 y.o.  Sex: female      I have evaluated the patient and they are stable and ready for discharge from the PACU.     Cardiovascular Function/Vital Signs  Visit Vitals  BP (!) 97/55   Pulse 65   Temp 98.1 °F (36.7 °C) (Oral)   Resp 15   Ht 1.651 m (5' 5\")   Wt 102.1 kg (225 lb)   SpO2 96%   Breastfeeding No   BMI 37.44 kg/m²       Patient is status post Monitor Anesthesia Care anesthesia for Procedure(s):  Insert ICD multi  Lv lead placement  Ultrasound guided vascular access.    Nausea/Vomiting: None    Postoperative hydration reviewed and adequate.    Pain:      Managed    Neurological Status:       At baseline    Mental Status, Level of Consciousness: Alert and  oriented to person, place, and time    Pulmonary Status:       Adequate oxygenation and airway patent    Complications related to anesthesia: None    Post-anesthesia assessment completed. No concerns    Signed By: Gerardo Francis MD     January 17, 2025

## 2025-01-17 NOTE — PROGRESS NOTES
Pt with complaints of 8/10 left chest procedure site aching pain; Brunilda Jimenez NP made aware. Pt to be medicated with Norco see MAR.

## 2025-01-17 NOTE — H&P
Office note from 12/11/2024 reviewed. No interim changes.   --------------------------------------------------------            Cardiac Electrophysiology OFFICE Consultation Note       Assessment/Plan:   1. Chronic systolic congestive heart failure (HCC)  -     EKG 12 Lead  2. Primary hypertension  3. Cardiomyopathy, unspecified type (HCC)  4. Left bundle branch block       Primary Cardiologist: Advanced HF     Chronic systolic heart failure:  - Initially diagnosed 6/2024, admitted to [facility] with acute systolic heart failure. LVEF 10-15%.  - Coronary CTA 3/29/24 showing normal coronaries per heart failure note  - Cardiac MRI with LVEF 25.1%, RVEF 36.2%. No prior ischemic myocardial damage, no definite evidence of interstitial fibrosis suggestive of infiltrative disease. Hyper trabeculated LV noted, which may represent LV non-compaction.   - Has lifevest but per patient it isn't working properly, she has contacted [employer] and they are going to assist her.  - On GDMT with Metoprolol, Aldactone, Entresto, and Jardiance.   - EKG today sinus bradycardia 54 bpm  ms  - in the setting of niCMP, LVEF of 25%, NYHA class II symptoms, LBBB with QRS of 182 ms, she meets class I indication for CRT-D.  - I have discussed the risks and benefits of ICD implant with the pt including but not limited to MI, death, bleeding, infection, lead dislodgement, pneumothorax, tamponade.  We have reviewed an evidence-based tool (https://patientdecisionaid.org/wp-content/uploads/2016/06/ICD-tool-shortened-V1-3-.pdf), all questions were answered and patient reports full understanding of discussion and wish to proceed with the procedure.  - Recommend BIV ICD implant next available  - Obtain labs prior to procedure    Nonischemic cardiomyopathy:  - See above    Hypertension:  - BP well controlled  - Continue current meds    LBBB  Proceed with CRT-D as noted above      Patient is an new patient with plan for longitudinal relationship and

## 2025-01-17 NOTE — PROCEDURES
CARDIAC RESYNCHRONIZATION THERAPY    Procedure Date: 01/17/25  Lab Physician: Sarah Herrmann MD    INDICATIONS:  50 yo woman with a history of HTN, non-ischemic cardiomyopathy, CHF with LVEF of 10-15%, NYHA class II, LBBB with QRS of 182 ms now referred for CRT-D implantation.     Comments:  Written informed consent was obtained from the patient after a full explanation of the risks and benefits of the procedure. The risks of bleeding / infection / cardiac perforation / pneumothorax / vascular injury / stroke and death were among those discussed. Alternatives were reviewed, including the option of no therapy. The patient was brought to the lab in the fasting state. Continuous electrocardiographic and hemodynamic monitoring was initiated. The implantation site was meticulously prepared with surgical scrub and allowed to dry with no pooling. Sterile draping was applied to cover the operation site. The image intensifier was draped with a sterile bag and positioned over the patient's chest. Local anesthesia was infiltrated subcutaneously at the access site. Anesthesia was administered by the anesthesia staff with appropriate monitoring.    Ultrasound-guided Access  After infiltration with Lidocaine, an incision was made slightly medial to the left deltopectoral groove. The incision was carried down to the level of the pectoral fascia using blunt dissection and electrocautery to obtain hemostasis. The axillary vein was obtained x2 using a micropuncture needle under ultrasound guidance (ultrasound evaluation of possible access sites. Patency of the selected vessel.  Realtime visualization of the vascular needle entry was performed) and a wire was advanced to the level of the IVC under fluoroscopic guidance.      RA and RV Leads Placement  A peel-away introducer was inserted over the wire. Using fluoroscopy, the Medtronic defibrillator RV lead was inserted through the introducer and advanced to the RV apical septum. The

## 2025-01-17 NOTE — PROGRESS NOTES
Cardiac Cath Lab Procedure Area Arrival Note:    Yudith Park arrived to Cardiac Cath Lab, Procedure Area. Patient identifiers verified with NAME and DATE OF BIRTH. Procedure verified with patient. Consent forms verified. Allergies verified. Patient informed of procedure and plan of care. Questions answered with review. Patient voiced understanding of procedure and plan of care.    Patient on cardiac monitor, non-invasive blood pressure, SPO2 monitor. On RA. IV of NS on pump at 25 ml/hr. Patient status doing well without problems. Patient is A&Ox 3. Patient reports no complaints at this time..     Patient medicated during procedure with orders obtained and verified by Dr. Herrmann.    Refer to patients Cardiac Cath Lab PROCEDURE REPORT and anesthesia chart for vital signs, assessment, status, and response during procedure, printed at end of case. Printed report on chart or scanned into chart.

## 2025-01-17 NOTE — PROGRESS NOTES
EP/Cath LAB to Recovery Room Report    Procedure: Biventricular ICD    MD: JACQUELYN Herrmann MD    Verbal Report given to Recovery Nurse on patient being transferred to Recovery Room for routine post-op. Patient stable upon transfer to .  Pt had MAC sedation with Anesthesia team, who managed MAR, vitals, and airway. Vitals, mental status, MAR, procedural summary discussed with recovery RN.       Procedural Site:    Incision site made to left chest.       Pacemaker Mode set to DDDR .

## 2025-01-17 NOTE — ANESTHESIA PRE PROCEDURE
Anesthesia Plan      MAC     ASA 3       Induction: intravenous.      Anesthetic plan and risks discussed with patient.      Plan discussed with CRNA.    Attending anesthesiologist reviewed and agrees with Preprocedure content                Gerardo Francis MD   1/17/2025

## 2025-01-17 NOTE — PROGRESS NOTES
TRANSFER - IN REPORT:    Verbal report received from Frances HERRERA and CRNA on Yudith Park  being received from procedure area for routine progression of patient care. Report consisted of patient’s Situation, Background, Assessment and Recommendations(SBAR). Information from the following report(s)SBAR, MAR, Recent Results, and Cardiac Rhythm V paced  was reviewed with the receiving clinician. Opportunity for questions and clarification was provided. Assessment completed upon patient’s arrival to Cardiac Cath Lab RECOVERY AREA and care assumed.       Cardiac Cath Lab Recovery Arrival Note:    Yudith Park arrived to CCL recovery area.  Patient procedure= BIVI/ICD. Patient on cardiac monitor, non-invasive blood pressure, SPO2 monitor. On Room Air .  IV  of NS on pump at 25 ml/hr. Patient status doing well without problems. Patient is A&Ox 4. Patient reports Left chest soreness at procedure site.    PROCEDURE SITE CHECK:    Procedure site:without any bleeding and No Hematoma, No pain/discomfort reported at procedure site.     No change in patient status. Continue to monitor patient and status.

## 2025-01-17 NOTE — PROGRESS NOTES
Brunilda Jimenez NP in to see Pt regarding Pt's left chest incisional pain. Pt made aware narcotics are not prescribed and to continue with tylenol and ice for comfort. Pt verbalized understanding.

## 2025-01-17 NOTE — DISCHARGE INSTRUCTIONS
CAV[facility] BS [facility]   4/10/2025 10:20 AM Laura Sorto, APRN - NP CAV[facility] BS AMB         Poncho Herrmann MD  Cardiac Electrophysiology / Cardiology    [facility] [place]  [facility]  [address] [facility], [address]        [address] [place] [address], [facility] [address]  [address]  [address] [phone] / [phone] Fax       [phone] / [phone] Fax

## 2025-01-17 NOTE — PROGRESS NOTES
Cardiac Cath Lab Recovery Arrival Note:      Yudith Park arrived to Cardiac Cath Lab, Recovery Area. Staff introduced to patient. Patient identifiers verified with NAME and DATE OF BIRTH. Procedure verified with patient. Consent forms reviewed and signed by patient or authorized representative and verified. Allergies verified. Patient informed of procedure and plan of care. Questions answered with review. Patient prepped for procedure, per orders from physician, prior to arrival.    Patient on cardiac monitor, non-invasive blood pressure, SPO2 monitor. Patient is A&Ox 4. Patient reports no pain.     Patient in stretcher, in low position, with side rails up, call bell within reach, patient instructed to call of assistance as needed.    Patient prep in: CCL Recovery Area, Bed# 2.   Family in: waiting area .   Prep by: CAREY Garcia RN

## 2025-01-30 ENCOUNTER — PROCEDURE VISIT (OUTPATIENT)
Age: 50
End: 2025-01-30

## 2025-01-30 DIAGNOSIS — Z95.810 PRESENCE OF BIVENTRICULAR AUTOMATIC CARDIOVERTER/DEFIBRILLATOR (AICD): Primary | ICD-10-CM

## 2025-01-30 NOTE — PROGRESS NOTES
Patient presents for wound check post-device implantation. The dressing was removed and the site was inspected. The site appeared to be well-healing without ecchymosis/tenderness/erythema. Denies pain, fevers, discharge. Steri Strips remain in place. Patient had pain at site under left arm. States resolved with ice and by applying heat under arm.     Did complain of STEM at left diaphragm area. Glenroy RAYMONDT assisted with testing and changing vector no STEM reported after movement.         Future Appointments   Date Time Provider Department Center   2/21/2025  9:30 AM Nicole Banuelos, APRN - NP AH BS AMB   4/10/2025 10:00 AM PACEMAKER, [facility] BS AMB   4/10/2025 10:20 AM Laura Sorto, APRN - NP [facility] BS AMB         Reviewed limb restrictions and site care, patient verbalized understanding  Continue follow up in device clinic as planned.

## 2025-03-05 ENCOUNTER — TELEPHONE (OUTPATIENT)
Age: 50
End: 2025-03-05

## 2025-03-05 NOTE — TELEPHONE ENCOUNTER
Left another vm asking pt to call me back to reschedule 2/21/25 appt she cancelled due to weather.

## 2025-03-12 ENCOUNTER — TELEPHONE (OUTPATIENT)
Age: 50
End: 2025-03-12

## 2025-03-12 NOTE — TELEPHONE ENCOUNTER
Still unsuccessful in reaching pt.  Left another detailed message requesting rtn call to reschedule follow-up in order to keep having scripts filled.

## 2025-04-06 NOTE — PROGRESS NOTES
[facility] Cardiology  Cardiac Electrophysiology Clinic Care Note                  []Initial visit     [x]Established visit     Patient Name: Yudith Park - :1975 - MRN:[id number]  Primary Cardiologist: Advanced Heart Failure  Electrophysiologist: Sarah Herrmann MD     Reason for visit: Biventricular ICD follow up    HPI:  Ms. Park is a 49 y.o. female who presents for follow up, is s/p Medtronic biventricular ICD (DOI 2025).    She reports doing well, denies chest pain or palpitations.  Mild MONDRAGON has been stable.    ECG today confirms biventricular pacing.    Cardiac MRI in 2024 showed LVEF 25.1% with dilated LV, RVEF 36.2% with dilated RV, & uniformly \"nulled\" myocardium, hypertrabeculated LV myocardium, most notably at mid to apical lateral wall.  No significant fibrosis.    Echo in 2024 showed LVEF 10-15% with severely dilated LV, mild LVH, grade III diastolic dysfunction, mildly dilated RV with moderate to severely reduced RVEF, severely dilated LA, mildly dilated RA, mild to mod MR, & moderate PH.    Coronary CTA in 2024 showed normal coronaries.    BP controlled.    Denies bleeding issues on ASA 81 mg po daily.      Previous:  S/p Medtronic biventricular ICD (DOI 2025).    CHF diagnosed in 2024, required admission for acute CHF.       Assessment and Plan       ICD-10-CM    1. Biventricular ICD (implantable cardioverter-defibrillator) in place  Z95.810 EKG 12 Lead      2. NICM (nonischemic cardiomyopathy) ([facility])  I42.8 EKG 12 Lead     ECHO COMPLETE ADULT (TTE) W OR WO CONTR- Clinic Performed      3. Chronic systolic CHF (congestive heart failure) ([facility])  I50.22 EKG 12 Lead     ECHO COMPLETE ADULT (TTE) W OR WO CONTR- Clinic Performed      4. Primary hypertension  I10 EKG 12 Lead      5. Cardiomyopathy, unspecified type ([facility])  I42.9           Medtronic biventricular ICD (DOI 2025):   -Device check today shows proper lead & generator function.  Generator

## 2025-04-07 RX ORDER — SACUBITRIL AND VALSARTAN 49; 51 MG/1; MG/1
1 TABLET, FILM COATED ORAL 2 TIMES DAILY
Qty: 60 TABLET | Refills: 0 | Status: SHIPPED | OUTPATIENT
Start: 2025-04-07 | End: 2025-04-10 | Stop reason: SDUPTHER

## 2025-04-07 NOTE — TELEPHONE ENCOUNTER
Requested Prescriptions     Pending Prescriptions Disp Refills    sacubitril-valsartan (ENTRESTO) 49-51 MG per tablet [Pharmacy Med Name: ENTRESTO 49 MG-51 MG TABLET] 60 tablet 0     Sig: TAKE 1 TABLET BY MOUTH TWICE A DAY      Last appt 11/19  Next appt 4/10

## 2025-04-08 ENCOUNTER — TELEPHONE (OUTPATIENT)
Age: 50
End: 2025-04-08

## 2025-04-08 NOTE — TELEPHONE ENCOUNTER
Telephone Call RE:  Appointment reminder     Outcome:     [x] Patient confirmed appointment   [] Patient rescheduled appointment for    [] Unable to reach  [] Left message              [] Other:       Pt's insurance on file came back rejected.  Pt does have new insurance but did not remember with whom and did not have time to give me the new information.  She said that she would go on [employer] and update it prior to her Thurs appt and would bring the card with her.  I have removed and termed the old insurance.

## 2025-04-10 ENCOUNTER — OFFICE VISIT (OUTPATIENT)
Age: 50
End: 2025-04-10
Payer: COMMERCIAL

## 2025-04-10 ENCOUNTER — PROCEDURE VISIT (OUTPATIENT)
Age: 50
End: 2025-04-10

## 2025-04-10 ENCOUNTER — OFFICE VISIT (OUTPATIENT)
Age: 50
End: 2025-04-10

## 2025-04-10 VITALS
HEART RATE: 67 BPM | BODY MASS INDEX: 40.32 KG/M2 | RESPIRATION RATE: 18 BRPM | TEMPERATURE: 97.6 F | WEIGHT: 242 LBS | HEIGHT: 65 IN | OXYGEN SATURATION: 97 % | DIASTOLIC BLOOD PRESSURE: 64 MMHG | SYSTOLIC BLOOD PRESSURE: 98 MMHG

## 2025-04-10 VITALS
SYSTOLIC BLOOD PRESSURE: 118 MMHG | HEART RATE: 66 BPM | WEIGHT: 240 LBS | RESPIRATION RATE: 18 BRPM | HEIGHT: 65 IN | BODY MASS INDEX: 39.99 KG/M2 | OXYGEN SATURATION: 96 % | DIASTOLIC BLOOD PRESSURE: 80 MMHG

## 2025-04-10 DIAGNOSIS — E55.9 VITAMIN D DEFICIENCY: ICD-10-CM

## 2025-04-10 DIAGNOSIS — Z79.899 ENCOUNTER FOR MONITORING DIURETIC THERAPY: ICD-10-CM

## 2025-04-10 DIAGNOSIS — I10 PRIMARY HYPERTENSION: ICD-10-CM

## 2025-04-10 DIAGNOSIS — R30.0 DYSURIA: ICD-10-CM

## 2025-04-10 DIAGNOSIS — I42.8 NICM (NONISCHEMIC CARDIOMYOPATHY) (HCC): ICD-10-CM

## 2025-04-10 DIAGNOSIS — Z95.810 BIVENTRICULAR ICD (IMPLANTABLE CARDIOVERTER-DEFIBRILLATOR) IN PLACE: Primary | ICD-10-CM

## 2025-04-10 DIAGNOSIS — Z51.81 ENCOUNTER FOR MONITORING DIURETIC THERAPY: ICD-10-CM

## 2025-04-10 DIAGNOSIS — I50.22 CHRONIC SYSTOLIC CHF (CONGESTIVE HEART FAILURE) (HCC): ICD-10-CM

## 2025-04-10 DIAGNOSIS — Z95.810 PRESENCE OF BIVENTRICULAR AUTOMATIC CARDIOVERTER/DEFIBRILLATOR (AICD): Primary | ICD-10-CM

## 2025-04-10 DIAGNOSIS — I50.22 CHRONIC HFREF (HEART FAILURE WITH REDUCED EJECTION FRACTION) (HCC): ICD-10-CM

## 2025-04-10 DIAGNOSIS — Z51.81 ENCOUNTER FOR MONITORING DIURETIC THERAPY: Primary | ICD-10-CM

## 2025-04-10 DIAGNOSIS — I42.9 CARDIOMYOPATHY, UNSPECIFIED TYPE (HCC): ICD-10-CM

## 2025-04-10 DIAGNOSIS — Z79.899 ENCOUNTER FOR MONITORING DIURETIC THERAPY: Primary | ICD-10-CM

## 2025-04-10 DIAGNOSIS — I50.20 SYSTOLIC HEART FAILURE, UNSPECIFIED HF CHRONICITY (HCC): ICD-10-CM

## 2025-04-10 PROCEDURE — 3079F DIAST BP 80-89 MM HG: CPT | Performed by: NURSE PRACTITIONER

## 2025-04-10 PROCEDURE — 93000 ELECTROCARDIOGRAM COMPLETE: CPT | Performed by: NURSE PRACTITIONER

## 2025-04-10 PROCEDURE — 1036F TOBACCO NON-USER: CPT | Performed by: NURSE PRACTITIONER

## 2025-04-10 PROCEDURE — G8427 DOCREV CUR MEDS BY ELIG CLIN: HCPCS | Performed by: NURSE PRACTITIONER

## 2025-04-10 PROCEDURE — G8417 CALC BMI ABV UP PARAM F/U: HCPCS | Performed by: NURSE PRACTITIONER

## 2025-04-10 PROCEDURE — 3074F SYST BP LT 130 MM HG: CPT | Performed by: NURSE PRACTITIONER

## 2025-04-10 RX ORDER — FUROSEMIDE 40 MG/1
TABLET ORAL
Qty: 60 TABLET | Refills: 3 | Status: SHIPPED | OUTPATIENT
Start: 2025-04-10

## 2025-04-10 RX ORDER — METOPROLOL SUCCINATE 25 MG/1
12.5 TABLET, EXTENDED RELEASE ORAL DAILY
Qty: 30 TABLET | Refills: 1 | Status: SHIPPED | OUTPATIENT
Start: 2025-04-10

## 2025-04-10 RX ORDER — SACUBITRIL AND VALSARTAN 49; 51 MG/1; MG/1
1 TABLET, FILM COATED ORAL 2 TIMES DAILY
Qty: 60 TABLET | Refills: 0 | Status: SHIPPED | OUTPATIENT
Start: 2025-04-10

## 2025-04-10 RX ORDER — LORATADINE 10 MG/1
10 CAPSULE, LIQUID FILLED ORAL DAILY
COMMUNITY

## 2025-04-10 RX ORDER — SPIRONOLACTONE 25 MG/1
25 TABLET ORAL DAILY
Qty: 90 TABLET | Refills: 1 | Status: SHIPPED | OUTPATIENT
Start: 2025-04-10

## 2025-04-10 ASSESSMENT — PATIENT HEALTH QUESTIONNAIRE - PHQ9
SUM OF ALL RESPONSES TO PHQ QUESTIONS 1-9: 0
SUM OF ALL RESPONSES TO PHQ QUESTIONS 1-9: 0
2. FEELING DOWN, DEPRESSED OR HOPELESS: NOT AT ALL
1. LITTLE INTEREST OR PLEASURE IN DOING THINGS: NOT AT ALL
SUM OF ALL RESPONSES TO PHQ QUESTIONS 1-9: 0
SUM OF ALL RESPONSES TO PHQ QUESTIONS 1-9: 0

## 2025-04-10 ASSESSMENT — ENCOUNTER SYMPTOMS
SHORTNESS OF BREATH: 0
COUGH: 0
ABDOMINAL PAIN: 1
ABDOMINAL DISTENTION: 0
BLOOD IN STOOL: 0
ALLERGIC/IMMUNOLOGIC NEGATIVE: 1
SORE THROAT: 0
CHEST TIGHTNESS: 0
EYE PAIN: 0

## 2025-04-10 NOTE — PROGRESS NOTES
[facility]  [facility] in [place], [place]  Outpatient Clinic Note      Patient name: Yudith Park  Patient : 1975  Patient MRN: [id number]  Date of service: 04/10/25      Chief Complaint   Patient presents with    Congestive Heart Failure    Fatigue      ASSESSMENT:  Yudith Park is a 49 y.o. female admitted with acute systolic heart failure. Recent coronary CTA showing normal coronary arteries. Non ischemic cardiomyopathy. NYHA class IV symptoms on presentation, now class II.   Stable off Milrinone. Good 6 minute walk distance off inotrope. 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  EF remains low at 25%      CURRENT VISIT HPI:   Ms. Park presents to heart failure clinic for routine.  She also saw Laura Sorto NP today in the office.  She complains of some mild fatigue, stating her energy level depends on how much she is able to sleep.  She gets occasional swelling in her ankles where she takes as an extra furosemide, she does this about 3 times a week.  She has trouble sleeping at night at night, but she does not snore or wake up gasping for air.  She missed her sleep study appointment due to hospitalization.  She denies chest pain, dizziness, palpitations, PND, orthopnea.  Her weight is up likely due to nutritional intake.  She has not been able to exercise since her procedure.  She will begin exercising and walking 15 minutes a day.  She has an  so her job is sedentary.  She is complaining of UTI symptoms, we will check a UA.  We will need to monitor this because she is on Jardiance.  She just complains complains of some burning planes of some burning occasionally.  She has not had a fever.  She has had no further syncopal episodes.  We discussed previous history of her breast tissue on her MRI and lung nodule on her CT scan.  She needs to follow-up on these items.  She is compliant with diet and medications.    INTERVAL  Detail Level: Zone Initiate Treatment: -Triamcinolone 0.1% cream: apply twice a day to bumps on arms x 2 wks or until resolved Plan: Recheck sites in 4 weeks

## 2025-04-10 NOTE — PATIENT INSTRUCTIONS
Medication changes:    None    Testing Ordered:    UA and labs in clinic today    Schedule    Other Recommendations:     Please talk to PCP about mammogram     - Record blood pressure and heart rate daily before medication and two hours after medication  - Record weight daily upon waking/after using the bathroom.   - Keep a written records of your weights and blood pressure and bring to your next appointment.   - Call the clinic at if you have a weight gain of 3 or more pounds overnight OR 5 or more pounds in one week, new/worsened shortness of breath or swelling, or if your blood pressure begins to consistently run below 90/60 and/or you begin to experience dizziness or lightheadedness. Our office phone number [phone] option 2.  - Ensure you are drinking an adequate amount of water with a goal of 6-8 eight ounce glasses (1.5-2 liters) of fluid daily. Your urine should be clear and light yellow straw colored.       Follow up in July after echo  with [facility]    Our monthly Heart Failure Support Group is held on the last Wednesday of every month from 5-6pm at [facility] ([place]). If you would like to attend, please RSVP to [email or web address]    Thank you for allowing us the privilege of being a part of your healthcare team! Please do not hesitate to contact our office at [phone] option 2 with any questions or concerns.

## 2025-04-10 NOTE — PATIENT INSTRUCTIONS
Call [phone] to schedule your echocardiogram.  If they're unable to schedule it in the [facility], please call us.

## 2025-04-11 LAB
25(OH)D3 SERPL-MCNC: 42.9 NG/ML (ref 30–100)
ALBUMIN SERPL-MCNC: 4.2 G/DL (ref 3.5–5)
ALBUMIN/GLOB SERPL: 1 (ref 1.1–2.2)
ALP SERPL-CCNC: 82 U/L (ref 45–117)
ALT SERPL-CCNC: 23 U/L (ref 12–78)
ANION GAP SERPL CALC-SCNC: 7 MMOL/L (ref 2–12)
APPEARANCE UR: CLEAR
AST SERPL-CCNC: 22 U/L (ref 15–37)
BACTERIA URNS QL MICRO: NEGATIVE /HPF
BILIRUB SERPL-MCNC: 0.3 MG/DL (ref 0.2–1)
BILIRUB UR QL: NEGATIVE
BUN SERPL-MCNC: 18 MG/DL (ref 6–20)
BUN/CREAT SERPL: 17 (ref 12–20)
CALCIUM SERPL-MCNC: 9.6 MG/DL (ref 8.5–10.1)
CHLORIDE SERPL-SCNC: 104 MMOL/L (ref 97–108)
CO2 SERPL-SCNC: 23 MMOL/L (ref 21–32)
COLOR UR: ABNORMAL
CREAT SERPL-MCNC: 1.06 MG/DL (ref 0.55–1.02)
EPITH CASTS URNS QL MICRO: ABNORMAL /LPF
GLOBULIN SER CALC-MCNC: 4.1 G/DL (ref 2–4)
GLUCOSE SERPL-MCNC: 244 MG/DL (ref 65–100)
GLUCOSE UR STRIP.AUTO-MCNC: >1000 MG/DL
HGB UR QL STRIP: NEGATIVE
HYALINE CASTS URNS QL MICRO: ABNORMAL /LPF (ref 0–5)
KETONES UR QL STRIP.AUTO: NEGATIVE MG/DL
LEUKOCYTE ESTERASE UR QL STRIP.AUTO: NEGATIVE
MAGNESIUM SERPL-MCNC: 2.3 MG/DL (ref 1.6–2.4)
NITRITE UR QL STRIP.AUTO: NEGATIVE
NT PRO BNP: 36 PG/ML
PH UR STRIP: 5.5 (ref 5–8)
POTASSIUM SERPL-SCNC: 3.9 MMOL/L (ref 3.5–5.1)
PROT SERPL-MCNC: 8.3 G/DL (ref 6.4–8.2)
PROT UR STRIP-MCNC: NEGATIVE MG/DL
RBC #/AREA URNS HPF: ABNORMAL /HPF (ref 0–5)
SODIUM SERPL-SCNC: 134 MMOL/L (ref 136–145)
SP GR UR REFRACTOMETRY: 1.01 (ref 1–1.03)
URINE CULTURE IF INDICATED: ABNORMAL
UROBILINOGEN UR QL STRIP.AUTO: 0.2 EU/DL (ref 0.2–1)
WBC URNS QL MICRO: ABNORMAL /HPF (ref 0–4)

## 2025-04-14 ENCOUNTER — RESULTS FOLLOW-UP (OUTPATIENT)
Age: 50
End: 2025-04-14

## 2025-04-14 DIAGNOSIS — Z51.81 ENCOUNTER FOR MONITORING DIURETIC THERAPY: ICD-10-CM

## 2025-04-14 DIAGNOSIS — I50.22 CHRONIC HFREF (HEART FAILURE WITH REDUCED EJECTION FRACTION) (HCC): Primary | ICD-10-CM

## 2025-04-14 DIAGNOSIS — Z79.899 ENCOUNTER FOR MONITORING DIURETIC THERAPY: ICD-10-CM

## 2025-04-14 NOTE — TELEPHONE ENCOUNTER
----- Message from INDIANA Khan - CNP sent at 4/14/2025  8:59 AM EDT -----  Patient labs stable.  Appears a little dry.  Does not have a UTI. Repeat labs in 1 month.  [facility]      Lab work fax to provided fax number [phone]

## 2025-05-18 PROCEDURE — 93297 REM INTERROG DEV EVAL ICPMS: CPT | Performed by: INTERNAL MEDICINE

## 2025-05-19 RX ORDER — ASPIRIN 81 MG/1
81 TABLET, COATED ORAL DAILY
Qty: 90 TABLET | Refills: 0 | Status: SHIPPED | OUTPATIENT
Start: 2025-05-19

## 2025-05-19 NOTE — TELEPHONE ENCOUNTER
Requested Prescriptions     Pending Prescriptions Disp Refills    aspirin (ASPIRIN LOW DOSE) 81 MG EC tablet [Pharmacy Med Name: ASPIRIN EC 81 MG TABLET] 90 tablet 0     Sig: TAKE 1 TABLET BY MOUTH EVERY DAY      Last appt 4/10  Next appt TBD July (on wait list)

## 2025-06-07 DIAGNOSIS — I50.22 CHRONIC HFREF (HEART FAILURE WITH REDUCED EJECTION FRACTION) (HCC): ICD-10-CM

## 2025-06-09 RX ORDER — SACUBITRIL AND VALSARTAN 49; 51 MG/1; MG/1
1 TABLET, FILM COATED ORAL 2 TIMES DAILY
Qty: 60 TABLET | Refills: 1 | Status: SHIPPED | OUTPATIENT
Start: 2025-06-09

## 2025-06-09 NOTE — TELEPHONE ENCOUNTER
Requested Prescriptions     Pending Prescriptions Disp Refills    sacubitril-valsartan (ENTRESTO) 49-51 MG per tablet [Pharmacy Med Name: ENTRESTO 49 MG-51 MG TABLET] 60 tablet 1     Sig: TAKE 1 TABLET BY MOUTH TWICE A DAY      Last appt 4/10  Next appt TBD July after echo

## 2025-07-10 ENCOUNTER — HOSPITAL ENCOUNTER (OUTPATIENT)
Facility: HOSPITAL | Age: 50
Discharge: HOME | End: 2025-07-12
Payer: COMMERCIAL

## 2025-07-10 VITALS
BODY MASS INDEX: 40.32 KG/M2 | WEIGHT: 242 LBS | SYSTOLIC BLOOD PRESSURE: 98 MMHG | HEIGHT: 65 IN | DIASTOLIC BLOOD PRESSURE: 64 MMHG

## 2025-07-10 DIAGNOSIS — I42.8 NICM (NONISCHEMIC CARDIOMYOPATHY) (HCC): ICD-10-CM

## 2025-07-10 DIAGNOSIS — I50.22 CHRONIC SYSTOLIC CHF (CONGESTIVE HEART FAILURE) (HCC): ICD-10-CM

## 2025-07-10 LAB
ECHO AO ASC DIAM: 2.8 CM
ECHO AO ASCENDING AORTA INDEX: 1.3 CM/M2
ECHO AO ROOT DIAM: 2.7 CM
ECHO AO ROOT INDEX: 1.26 CM/M2
ECHO AV AREA PEAK VELOCITY: 2.5 CM2
ECHO AV AREA VTI: 2.6 CM2
ECHO AV AREA/BSA PEAK VELOCITY: 1.2 CM2/M2
ECHO AV AREA/BSA VTI: 1.2 CM2/M2
ECHO AV MEAN GRADIENT: 5 MMHG
ECHO AV MEAN VELOCITY: 1 M/S
ECHO AV PEAK GRADIENT: 8 MMHG
ECHO AV PEAK VELOCITY: 1.4 M/S
ECHO AV VELOCITY RATIO: 0.79
ECHO AV VTI: 27.9 CM
ECHO BSA: 2.24 M2
ECHO EST RA PRESSURE: 15 MMHG
ECHO LA VOL A-L A2C: 73 ML (ref 22–52)
ECHO LA VOL A-L A4C: 85 ML (ref 22–52)
ECHO LA VOL BP: 75 ML (ref 22–52)
ECHO LA VOL MOD A2C: 70 ML (ref 22–52)
ECHO LA VOL MOD A4C: 78 ML (ref 22–52)
ECHO LA VOL/BSA BIPLANE: 35 ML/M2 (ref 16–34)
ECHO LA VOLUME AREA LENGTH: 81 ML
ECHO LA VOLUME INDEX A-L A2C: 34 ML/M2 (ref 16–34)
ECHO LA VOLUME INDEX A-L A4C: 40 ML/M2 (ref 16–34)
ECHO LA VOLUME INDEX AREA LENGTH: 38 ML/M2 (ref 16–34)
ECHO LA VOLUME INDEX MOD A2C: 33 ML/M2 (ref 16–34)
ECHO LA VOLUME INDEX MOD A4C: 36 ML/M2 (ref 16–34)
ECHO LV E' LATERAL VELOCITY: 4.28 CM/S
ECHO LV E' SEPTAL VELOCITY: 4.23 CM/S
ECHO LV EDV A2C: 197 ML
ECHO LV EDV A4C: 191 ML
ECHO LV EDV BP: 199 ML (ref 56–104)
ECHO LV EDV INDEX A4C: 89 ML/M2
ECHO LV EDV INDEX BP: 93 ML/M2
ECHO LV EDV NDEX A2C: 92 ML/M2
ECHO LV EF PHYSICIAN: 20 %
ECHO LV EJECTION FRACTION A2C: 24 %
ECHO LV EJECTION FRACTION A4C: 26 %
ECHO LV EJECTION FRACTION BIPLANE: 25 % (ref 55–100)
ECHO LV ESV A2C: 150 ML
ECHO LV ESV A4C: 142 ML
ECHO LV ESV BP: 149 ML (ref 19–49)
ECHO LV ESV INDEX A2C: 70 ML/M2
ECHO LV ESV INDEX A4C: 66 ML/M2
ECHO LV ESV INDEX BP: 69 ML/M2
ECHO LV FRACTIONAL SHORTENING: 7 % (ref 28–44)
ECHO LV INTERNAL DIMENSION DIASTOLE INDEX: 2.79 CM/M2
ECHO LV INTERNAL DIMENSION DIASTOLIC: 6 CM (ref 3.9–5.3)
ECHO LV INTERNAL DIMENSION SYSTOLIC INDEX: 2.6 CM/M2
ECHO LV INTERNAL DIMENSION SYSTOLIC: 5.6 CM
ECHO LV IVSD: 0.8 CM (ref 0.6–0.9)
ECHO LV MASS 2D: 171.9 G (ref 67–162)
ECHO LV MASS INDEX 2D: 79.9 G/M2 (ref 43–95)
ECHO LV POSTERIOR WALL DIASTOLIC: 0.7 CM (ref 0.6–0.9)
ECHO LV RELATIVE WALL THICKNESS RATIO: 0.23
ECHO LVOT AREA: 3.1 CM2
ECHO LVOT AV VTI INDEX: 0.82
ECHO LVOT DIAM: 2 CM
ECHO LVOT MEAN GRADIENT: 3 MMHG
ECHO LVOT PEAK GRADIENT: 5 MMHG
ECHO LVOT PEAK VELOCITY: 1.1 M/S
ECHO LVOT STROKE VOLUME INDEX: 33.6 ML/M2
ECHO LVOT SV: 72.2 ML
ECHO LVOT VTI: 23 CM
ECHO MV A VELOCITY: 0.84 M/S
ECHO MV E DECELERATION TIME (DT): 307.3 MS
ECHO MV E VELOCITY: 0.85 M/S
ECHO MV E/A RATIO: 1.01
ECHO MV E/E' LATERAL: 19.86
ECHO MV E/E' RATIO (AVERAGED): 19.98
ECHO MV E/E' SEPTAL: 20.09
ECHO PV MAX VELOCITY: 1.1 M/S
ECHO PV PEAK GRADIENT: 4 MMHG
ECHO RA VOLUME: 46 ML
ECHO RA VOLUME: 50 ML
ECHO RIGHT VENTRICULAR SYSTOLIC PRESSURE (RVSP): 49 MMHG
ECHO RV BASAL DIMENSION: 5 CM
ECHO RV FREE WALL PEAK S': 14.7 CM/S
ECHO RV MID DIMENSION: 3.3 CM
ECHO RV TAPSE: 2.9 CM (ref 1.7–?)
ECHO TV REGURGITANT MAX VELOCITY: 2.9 M/S
ECHO TV REGURGITANT PEAK GRADIENT: 34 MMHG

## 2025-07-10 PROCEDURE — 93306 TTE W/DOPPLER COMPLETE: CPT | Performed by: INTERNAL MEDICINE

## 2025-07-10 PROCEDURE — 6360000004 HC RX CONTRAST MEDICATION: Performed by: NURSE PRACTITIONER

## 2025-07-10 PROCEDURE — 93306 TTE W/DOPPLER COMPLETE: CPT

## 2025-07-10 RX ADMIN — SULFUR HEXAFLUORIDE 2 ML: KIT at 09:03

## 2025-07-27 PROCEDURE — 93297 REM INTERROG DEV EVAL ICPMS: CPT | Performed by: INTERNAL MEDICINE

## 2025-08-12 ENCOUNTER — TELEPHONE (OUTPATIENT)
Age: 50
End: 2025-08-12

## 2025-08-12 DIAGNOSIS — I50.22 CHRONIC HFREF (HEART FAILURE WITH REDUCED EJECTION FRACTION) (HCC): ICD-10-CM

## 2025-08-12 RX ORDER — SACUBITRIL AND VALSARTAN 49; 51 MG/1; MG/1
1 TABLET, FILM COATED ORAL 2 TIMES DAILY
Qty: 60 TABLET | Refills: 2 | Status: SHIPPED | OUTPATIENT
Start: 2025-08-12

## 2025-08-15 ENCOUNTER — TELEPHONE (OUTPATIENT)
Age: 50
End: 2025-08-15

## 2025-08-20 ENCOUNTER — TELEPHONE (OUTPATIENT)
Age: 50
End: 2025-08-20

## 2025-08-25 RX ORDER — ASPIRIN 81 MG/1
81 TABLET, COATED ORAL DAILY
Qty: 90 TABLET | Refills: 0 | Status: SHIPPED | OUTPATIENT
Start: 2025-08-25

## 2025-08-27 PROCEDURE — 93297 REM INTERROG DEV EVAL ICPMS: CPT | Performed by: INTERNAL MEDICINE

## (undated) DEVICE — PACEMAKER PACK: Brand: MEDLINE INDUSTRIES, INC.

## (undated) DEVICE — SPECIAL PROCEDURE DRAPE 32" X 34": Brand: SPECIAL PROCEDURE DRAPE

## (undated) DEVICE — INTRODUCER SPLIT SHEATH PRELUDE SNAP 9FR X 13CM

## (undated) DEVICE — GLIDESHEATH SLENDER STAINLESS STEEL KIT: Brand: GLIDESHEATH SLENDER

## (undated) DEVICE — PACK PROCEDURE SURG HRT CATH

## (undated) DEVICE — DRESSING ANTIMIC FOAM OPTIFOAM POSTOP ADH 4 X 6 IN

## (undated) DEVICE — PADPRO DEFIBRILLATION/PACING/CARDIOVERSION/MONITORING ELECTRODES, ADULT/CHILD GREATER THAN 10 KG RADIOTRANSPARENT ELECTRODE, PHYSIO-CONTROL QUIK-COMBO (M) 60" (152 CM): Brand: PADPRO

## (undated) DEVICE — INTRODUCER SHTH L13CM OD7FR SH ORNG HUB SEAMLESS SAFSHTH

## (undated) DEVICE — SYRINGE MED 10ML LUERLOCK TIP W/O SFTY DISP

## (undated) DEVICE — KIT HND CTRL 3 W STPCOCK ROT END 54IN PREM HI PRSS TBNG AT

## (undated) DEVICE — KIT MED IMAG CNTRST AGNT W/ IOPAMIDOL REUSE

## (undated) DEVICE — SUTURE MONOCRYL STRATAFIX SPRL + SZ 4-0 L12IN ABSRB UD PS-2 SXMP1B117

## (undated) DEVICE — KIT MFLD ISOLATN NACL CNTRST PRT TBNG SPIK W/ PRSS TRNSDUC

## (undated) DEVICE — RADIFOCUS GLIDEWIRE ADVANTAGE GUIDEWIRE: Brand: GLIDEWIRE ADVANTAGE

## (undated) DEVICE — STRIP SKIN CLSR W0.25XL4IN WHT SPUNBOUND FBR NYL HI ADH

## (undated) DEVICE — GUIDE WIRE WITH HYDROPHILIC COATING: Brand: ACUITY WHISPER VIEW™

## (undated) DEVICE — GUIDEWIRE VASC L80CM DIA0.018IN TIP L5CM 15DEG ANG NIT

## (undated) DEVICE — PROVE COVER: Brand: UNBRANDED

## (undated) DEVICE — 3M™ IOBAN™ 2 ANTIMICROBIAL INCISE DRAPE 6650EZ: Brand: IOBAN™ 2

## (undated) DEVICE — CO-SET DELIVERY SYSTEM FOR 123 ROOM TEMPATURE INJECTATE: Brand: CO-SET+

## (undated) DEVICE — KIT INTRO 9FR L13CM DIA0.118IN SPLITTABLE HEMSTAT ROBUST

## (undated) DEVICE — CATHETER PRESSURE WEDGE BLLN 6FRX110CM

## (undated) DEVICE — SUTURE MONOCRYL + ABSORBABLE MONOFILAMENT 2-0 CT1 12 IN UD SXMP1B412

## (undated) DEVICE — ROYAL SILK SURGICAL GOWN, XXL: Brand: CONVERTORS

## (undated) DEVICE — ANGIOGRAPHY KIT

## (undated) DEVICE — SUTURE MNFLMNT SH 26 MM 1/2 CI CRCLE TPR PNT SYMTRC PD PLU

## (undated) DEVICE — Device

## (undated) DEVICE — SUTURE PERMAHAND SZ 0 L30IN NONABSORBABLE BLK L26MM SH 1/2 K834H

## (undated) DEVICE — CATHETER ART THERMODILUTION 6 FRX110 CM 4 LUMEN SWAN